# Patient Record
Sex: MALE | Race: BLACK OR AFRICAN AMERICAN | NOT HISPANIC OR LATINO | Employment: UNEMPLOYED | URBAN - METROPOLITAN AREA
[De-identification: names, ages, dates, MRNs, and addresses within clinical notes are randomized per-mention and may not be internally consistent; named-entity substitution may affect disease eponyms.]

---

## 2023-10-05 ENCOUNTER — HOSPITAL ENCOUNTER (EMERGENCY)
Facility: HOSPITAL | Age: 48
Discharge: STILL ADMITTED OR ADMINISTRATIVE | End: 2023-10-06
Payer: MEDICAID

## 2023-10-05 VITALS
OXYGEN SATURATION: 98 % | RESPIRATION RATE: 15 BRPM | WEIGHT: 150 LBS | HEIGHT: 67 IN | HEART RATE: 96 BPM | SYSTOLIC BLOOD PRESSURE: 164 MMHG | BODY MASS INDEX: 23.54 KG/M2 | TEMPERATURE: 98.2 F | DIASTOLIC BLOOD PRESSURE: 100 MMHG

## 2023-10-05 PROCEDURE — 4500999001 HC ED NO CHARGE

## 2023-10-05 PROCEDURE — 99281 EMR DPT VST MAYX REQ PHY/QHP: CPT

## 2023-10-05 ASSESSMENT — LIFESTYLE VARIABLES
EVER FELT BAD OR GUILTY ABOUT YOUR DRINKING: NO
HAVE YOU EVER FELT YOU SHOULD CUT DOWN ON YOUR DRINKING: NO
HAVE PEOPLE ANNOYED YOU BY CRITICIZING YOUR DRINKING: NO
EVER HAD A DRINK FIRST THING IN THE MORNING TO STEADY YOUR NERVES TO GET RID OF A HANGOVER: NO

## 2023-10-05 ASSESSMENT — COLUMBIA-SUICIDE SEVERITY RATING SCALE - C-SSRS
2. HAVE YOU ACTUALLY HAD ANY THOUGHTS OF KILLING YOURSELF?: NO
6. HAVE YOU EVER DONE ANYTHING, STARTED TO DO ANYTHING, OR PREPARED TO DO ANYTHING TO END YOUR LIFE?: NO
1. IN THE PAST MONTH, HAVE YOU WISHED YOU WERE DEAD OR WISHED YOU COULD GO TO SLEEP AND NOT WAKE UP?: NO

## 2023-11-28 ENCOUNTER — HOSPITAL ENCOUNTER (EMERGENCY)
Facility: HOSPITAL | Age: 48
Discharge: HOME | End: 2023-11-28
Attending: EMERGENCY MEDICINE
Payer: MEDICAID

## 2023-11-28 VITALS
SYSTOLIC BLOOD PRESSURE: 152 MMHG | HEART RATE: 78 BPM | OXYGEN SATURATION: 98 % | RESPIRATION RATE: 18 BRPM | DIASTOLIC BLOOD PRESSURE: 81 MMHG | TEMPERATURE: 97.6 F

## 2023-11-28 DIAGNOSIS — R55 SYNCOPE, UNSPECIFIED SYNCOPE TYPE: Primary | ICD-10-CM

## 2023-11-28 LAB
ALBUMIN SERPL BCP-MCNC: 4.7 G/DL (ref 3.4–5)
ALP SERPL-CCNC: 96 U/L (ref 33–120)
ALT SERPL W P-5'-P-CCNC: 13 U/L (ref 10–52)
ANION GAP SERPL CALC-SCNC: 20 MMOL/L (ref 10–20)
AST SERPL W P-5'-P-CCNC: 82 U/L (ref 9–39)
BASOPHILS # BLD AUTO: 0.04 X10*3/UL (ref 0–0.1)
BASOPHILS NFR BLD AUTO: 0.9 %
BILIRUB SERPL-MCNC: 0.5 MG/DL (ref 0–1.2)
BUN SERPL-MCNC: 17 MG/DL (ref 6–23)
CALCIUM SERPL-MCNC: 8.9 MG/DL (ref 8.6–10.3)
CARDIAC TROPONIN I PNL SERPL HS: 5 NG/L (ref 0–20)
CARDIAC TROPONIN I PNL SERPL HS: 5 NG/L (ref 0–20)
CHLORIDE SERPL-SCNC: 98 MMOL/L (ref 98–107)
CO2 SERPL-SCNC: 25 MMOL/L (ref 21–32)
CREAT SERPL-MCNC: 0.79 MG/DL (ref 0.5–1.3)
EOSINOPHIL # BLD AUTO: 0.02 X10*3/UL (ref 0–0.7)
EOSINOPHIL NFR BLD AUTO: 0.5 %
ERYTHROCYTE [DISTWIDTH] IN BLOOD BY AUTOMATED COUNT: 22.1 % (ref 11.5–14.5)
GFR SERPL CREATININE-BSD FRML MDRD: >90 ML/MIN/1.73M*2
GLUCOSE SERPL-MCNC: 140 MG/DL (ref 74–99)
HCT VFR BLD AUTO: 33.8 % (ref 41–52)
HGB BLD-MCNC: 12.3 G/DL (ref 13.5–17.5)
IMM GRANULOCYTES # BLD AUTO: 0.02 X10*3/UL (ref 0–0.7)
IMM GRANULOCYTES NFR BLD AUTO: 0.5 % (ref 0–0.9)
LYMPHOCYTES # BLD AUTO: 1.33 X10*3/UL (ref 1.2–4.8)
LYMPHOCYTES NFR BLD AUTO: 30.6 %
MCH RBC QN AUTO: 37 PG (ref 26–34)
MCHC RBC AUTO-ENTMCNC: 36.4 G/DL (ref 32–36)
MCV RBC AUTO: 102 FL (ref 80–100)
MONOCYTES # BLD AUTO: 0.38 X10*3/UL (ref 0.1–1)
MONOCYTES NFR BLD AUTO: 8.8 %
NEUTROPHILS # BLD AUTO: 2.55 X10*3/UL (ref 1.2–7.7)
NEUTROPHILS NFR BLD AUTO: 58.7 %
NRBC BLD-RTO: 0 /100 WBCS (ref 0–0)
PLATELET # BLD AUTO: 73 X10*3/UL (ref 150–450)
POTASSIUM SERPL-SCNC: 3.2 MMOL/L (ref 3.5–5.3)
PROT SERPL-MCNC: 7.3 G/DL (ref 6.4–8.2)
RBC # BLD AUTO: 3.32 X10*6/UL (ref 4.5–5.9)
RBC MORPH BLD: NORMAL
SODIUM SERPL-SCNC: 140 MMOL/L (ref 136–145)
STOMATOCYTES BLD QL SMEAR: NORMAL
TARGETS BLD QL SMEAR: NORMAL
WBC # BLD AUTO: 4.3 X10*3/UL (ref 4.4–11.3)

## 2023-11-28 PROCEDURE — 84484 ASSAY OF TROPONIN QUANT: CPT | Performed by: PHYSICIAN ASSISTANT

## 2023-11-28 PROCEDURE — 99284 EMERGENCY DEPT VISIT MOD MDM: CPT | Performed by: EMERGENCY MEDICINE

## 2023-11-28 PROCEDURE — 85025 COMPLETE CBC W/AUTO DIFF WBC: CPT | Performed by: PHYSICIAN ASSISTANT

## 2023-11-28 PROCEDURE — 84484 ASSAY OF TROPONIN QUANT: CPT | Performed by: EMERGENCY MEDICINE

## 2023-11-28 PROCEDURE — 36415 COLL VENOUS BLD VENIPUNCTURE: CPT | Performed by: PHYSICIAN ASSISTANT

## 2023-11-28 PROCEDURE — 80053 COMPREHEN METABOLIC PANEL: CPT | Performed by: PHYSICIAN ASSISTANT

## 2023-11-28 PROCEDURE — 36415 COLL VENOUS BLD VENIPUNCTURE: CPT | Performed by: EMERGENCY MEDICINE

## 2023-11-28 PROCEDURE — 99283 EMERGENCY DEPT VISIT LOW MDM: CPT

## 2023-11-28 RX ORDER — POTASSIUM CHLORIDE 20 MEQ/1
20 TABLET, EXTENDED RELEASE ORAL ONCE
Status: DISCONTINUED | OUTPATIENT
Start: 2023-11-28 | End: 2023-11-29 | Stop reason: HOSPADM

## 2023-11-28 RX ORDER — POTASSIUM CHLORIDE 20 MEQ/1
20 TABLET, EXTENDED RELEASE ORAL DAILY
Status: DISCONTINUED | OUTPATIENT
Start: 2023-11-29 | End: 2023-11-28

## 2023-11-28 ASSESSMENT — PAIN SCALES - GENERAL: PAINLEVEL_OUTOF10: 0 - NO PAIN

## 2023-11-28 ASSESSMENT — COLUMBIA-SUICIDE SEVERITY RATING SCALE - C-SSRS
6. HAVE YOU EVER DONE ANYTHING, STARTED TO DO ANYTHING, OR PREPARED TO DO ANYTHING TO END YOUR LIFE?: NO
2. HAVE YOU ACTUALLY HAD ANY THOUGHTS OF KILLING YOURSELF?: NO
1. IN THE PAST MONTH, HAVE YOU WISHED YOU WERE DEAD OR WISHED YOU COULD GO TO SLEEP AND NOT WAKE UP?: NO

## 2023-11-28 ASSESSMENT — PAIN - FUNCTIONAL ASSESSMENT: PAIN_FUNCTIONAL_ASSESSMENT: 0-10

## 2023-11-28 NOTE — ED TRIAGE NOTES
To ed from home with c/o dizziness, near syncope per pt at work.  Denies CP, SOB. ECG done upon triage

## 2023-11-28 NOTE — ED TRIAGE NOTES
TRIAGE NOTE   I saw the patient as the Clinician in Triage and performed a brief history and physical exam, established acuity, and ordered appropriate tests to develop basic plan of care. Patient will be seen by an ALEKSANDRA, resident and/or physician who will independently evaluate the patient. Please see subsequent provider notes for further details and disposition.     Brief HPI: In brief, Aguilar Wills is a 48 y.o. male that presents for syncopal episode 5 PM.  He was standing became dizzy nauseated diaphoretic.  Coworkers caught him preventing fall or injury..  No cardiorespiratory symptoms.  No neurological symptoms.  No bowel or bladder incontinence.  2 prior episodes of unclear etiology.  Treated for hypertension off his medications for 4 days.  Not diabetic.  No melena.  No GI or  symptoms.    Focused Physical exam:   Vital signs are stable mild tachycardia 102.  Afebrile.  Alert coherent ambulating without difficulty.  Normal neurologic exam.  Cardiovascular regular rate and rhythm.  Lungs clear to oscitation.    Plan/MDM:   EKG no STEMI.  Sinus rhythm rate 90.  QTc 408.  Orthostatic vitals laboratories studies ordered.    Please see subsequent provider note for further details and disposition

## 2023-11-29 NOTE — ED PROVIDER NOTES
"HPI   Chief Complaint   Patient presents with    Dizziness     To ed from work with feeling dizziness. States \"I almost past out\".  Denies pain or discomfort at this time        This is a 48-year-old male who presents to the emergency department after syncopal episode.  The patient states that he was at work when he became lightheaded and passed out.  The patient states that he has had similar episodes twice in the past.  There has been no cause.  At the time of this assessment, the patient feels back to normal.  He denies chest pain.  He denies shortness of breath.  He denies his heart racing.  He denies headache.  He denies numbness or weakness in his arms or legs.    Past medical history: Kidney failure, pancreatitis, hypertension                          [place] Coma Scale Score: 15                  Patient History   Past Medical History:   Diagnosis Date    Hypertension     Pancreatitis      No past surgical history on file.  No family history on file.  Social History     Tobacco Use    Smoking status: Every Day     Packs/day: 1     Types: Cigarettes    Smokeless tobacco: Never   Substance Use Topics    Alcohol use: Not on file    Drug use: Not on file       Physical Exam   ED Triage Vitals [11/28/23 1726]   Temp Heart Rate Resp BP   36.4 °C (97.6 °F) 102 16 121/82      SpO2 Temp src Heart Rate Source Patient Position   100 % -- -- --      BP Location FiO2 (%)     -- --       Physical Exam  Vitals and nursing note reviewed.   HENT:      Head: Normocephalic and atraumatic.      Nose: Nose normal.   Eyes:      Conjunctiva/sclera: Conjunctivae normal.   Cardiovascular:      Rate and Rhythm: Normal rate and regular rhythm.      Pulses: Normal pulses.      Heart sounds: Normal heart sounds.   Pulmonary:      Effort: Pulmonary effort is normal.      Breath sounds: Normal breath sounds.   Abdominal:      General: Bowel sounds are normal.      Palpations: Abdomen is soft.   Musculoskeletal:         General: Normal " range of motion.      Cervical back: Normal range of motion and neck supple.   Skin:     Findings: No rash.   Neurological:      General: No focal deficit present.      Mental Status: He is alert and oriented to person, place, and time.   Psychiatric:         Mood and Affect: Mood normal.         ED Course & MDM   Diagnoses as of 11/28/23 2126   Syncope, unspecified syncope type       Medical Decision Making  Differential diagnosis: I have considered the following conditions in my assessment of   this patient's condition: Hypotension, syncope, near syncope, abdominal aneurysm, cerebral vascular   accident, transient ischemic attack, GI bleed, ACS, pulmonary embolus, seizure, arrhythmia.    This is a 48-year-old male who presents to the emergency department complaining of syncope.  The patient is asymptomatic at the time of his evaluation in the emergency department.  He was evaluated with labs and EKG. Labs showed 2 negative troponins.  His white blood count is low at 4.3, hemoglobin mildly low at 12.3, platelet count low at 73.  On review of the patient's previous labs he has had similar labs to these.  Discussed these findings with the patient.  Suggested follow-up with hematology as an outpatient.  The patient is low risk based on [place] syncope rules.  He is appropriate for discharge and follow-up as an outpatient.    Amount and/or Complexity of Data Reviewed  ECG/medicine tests: independent interpretation performed.     Details: Normal sinus rhythm, heart rate 90, normal axis, no ST elevation.        Procedure  Procedures     Myron Roque MD  11/28/23 2131

## 2024-02-02 ENCOUNTER — HOSPITAL ENCOUNTER (EMERGENCY)
Facility: HOSPITAL | Age: 49
Discharge: HOME | End: 2024-02-03
Attending: EMERGENCY MEDICINE
Payer: MEDICAID

## 2024-02-02 DIAGNOSIS — F43.21 GRIEF: ICD-10-CM

## 2024-02-02 DIAGNOSIS — G47.00 INSOMNIA, UNSPECIFIED TYPE: Primary | ICD-10-CM

## 2024-02-02 DIAGNOSIS — F32.A DEPRESSION, UNSPECIFIED DEPRESSION TYPE: ICD-10-CM

## 2024-02-02 PROCEDURE — 99285 EMERGENCY DEPT VISIT HI MDM: CPT | Performed by: EMERGENCY MEDICINE

## 2024-02-02 PROCEDURE — 99285 EMERGENCY DEPT VISIT HI MDM: CPT | Mod: 25 | Performed by: EMERGENCY MEDICINE

## 2024-02-03 ENCOUNTER — CLINICAL SUPPORT (OUTPATIENT)
Dept: EMERGENCY MEDICINE | Facility: HOSPITAL | Age: 49
End: 2024-02-03
Payer: MEDICAID

## 2024-02-03 VITALS
DIASTOLIC BLOOD PRESSURE: 99 MMHG | OXYGEN SATURATION: 98 % | HEART RATE: 75 BPM | TEMPERATURE: 98.2 F | WEIGHT: 154.32 LBS | BODY MASS INDEX: 24.22 KG/M2 | RESPIRATION RATE: 16 BRPM | SYSTOLIC BLOOD PRESSURE: 149 MMHG | HEIGHT: 67 IN

## 2024-02-03 LAB
ALBUMIN SERPL BCP-MCNC: 4.4 G/DL (ref 3.4–5)
ALP SERPL-CCNC: 86 U/L (ref 33–120)
ALT SERPL W P-5'-P-CCNC: 26 U/L (ref 10–52)
AMPHETAMINES UR QL SCN: NORMAL
ANION GAP SERPL CALC-SCNC: 18 MMOL/L (ref 10–20)
APAP SERPL-MCNC: <10 UG/ML
APPEARANCE UR: CLEAR
AST SERPL W P-5'-P-CCNC: 55 U/L (ref 9–39)
BARBITURATES UR QL SCN: NORMAL
BASOPHILS # BLD AUTO: 0.1 X10*3/UL (ref 0–0.1)
BASOPHILS NFR BLD AUTO: 2.1 %
BENZODIAZ UR QL SCN: NORMAL
BILIRUB SERPL-MCNC: 0.6 MG/DL (ref 0–1.2)
BILIRUB UR STRIP.AUTO-MCNC: NEGATIVE MG/DL
BUN SERPL-MCNC: 18 MG/DL (ref 6–23)
BZE UR QL SCN: NORMAL
CALCIUM SERPL-MCNC: 9.1 MG/DL (ref 8.6–10.6)
CANNABINOIDS UR QL SCN: NORMAL
CHLORIDE SERPL-SCNC: 103 MMOL/L (ref 98–107)
CO2 SERPL-SCNC: 27 MMOL/L (ref 21–32)
COLOR UR: YELLOW
CREAT SERPL-MCNC: 0.76 MG/DL (ref 0.5–1.3)
EGFRCR SERPLBLD CKD-EPI 2021: >90 ML/MIN/1.73M*2
EOSINOPHIL # BLD AUTO: 0.13 X10*3/UL (ref 0–0.7)
EOSINOPHIL NFR BLD AUTO: 2.8 %
ERYTHROCYTE [DISTWIDTH] IN BLOOD BY AUTOMATED COUNT: 13.1 % (ref 11.5–14.5)
ETHANOL SERPL-MCNC: 275 MG/DL
FENTANYL+NORFENTANYL UR QL SCN: NORMAL
GLUCOSE SERPL-MCNC: 82 MG/DL (ref 74–99)
GLUCOSE UR STRIP.AUTO-MCNC: NEGATIVE MG/DL
HCT VFR BLD AUTO: 38.6 % (ref 41–52)
HGB BLD-MCNC: 14 G/DL (ref 13.5–17.5)
HOLD SPECIMEN: NORMAL
IMM GRANULOCYTES # BLD AUTO: 0 X10*3/UL (ref 0–0.7)
IMM GRANULOCYTES NFR BLD AUTO: 0 % (ref 0–0.9)
KETONES UR STRIP.AUTO-MCNC: ABNORMAL MG/DL
LEUKOCYTE ESTERASE UR QL STRIP.AUTO: NEGATIVE
LYMPHOCYTES # BLD AUTO: 3.76 X10*3/UL (ref 1.2–4.8)
LYMPHOCYTES NFR BLD AUTO: 80.2 %
MCH RBC QN AUTO: 36.3 PG (ref 26–34)
MCHC RBC AUTO-ENTMCNC: 36.3 G/DL (ref 32–36)
MCV RBC AUTO: 100 FL (ref 80–100)
MONOCYTES # BLD AUTO: 0.2 X10*3/UL (ref 0.1–1)
MONOCYTES NFR BLD AUTO: 4.3 %
MUCOUS THREADS #/AREA URNS AUTO: NORMAL /LPF
NEUTROPHILS # BLD AUTO: 0.5 X10*3/UL (ref 1.2–7.7)
NEUTROPHILS NFR BLD AUTO: 10.6 %
NITRITE UR QL STRIP.AUTO: NEGATIVE
NRBC BLD-RTO: 0 /100 WBCS (ref 0–0)
OPIATES UR QL SCN: NORMAL
OXYCODONE+OXYMORPHONE UR QL SCN: NORMAL
PCP UR QL SCN: NORMAL
PH UR STRIP.AUTO: 7 [PH]
PLATELET # BLD AUTO: ABNORMAL 10*3/UL
POTASSIUM SERPL-SCNC: 4 MMOL/L (ref 3.5–5.3)
PROT SERPL-MCNC: 7.6 G/DL (ref 6.4–8.2)
PROT UR STRIP.AUTO-MCNC: ABNORMAL MG/DL
RBC # BLD AUTO: 3.86 X10*6/UL (ref 4.5–5.9)
RBC # UR STRIP.AUTO: NEGATIVE /UL
RBC #/AREA URNS AUTO: NORMAL /HPF
RBC MORPH BLD: NORMAL
SALICYLATES SERPL-MCNC: <3 MG/DL
SARS-COV-2 RNA RESP QL NAA+PROBE: NOT DETECTED
SODIUM SERPL-SCNC: 144 MMOL/L (ref 136–145)
SP GR UR STRIP.AUTO: 1.02
STOMATOCYTES BLD QL SMEAR: NORMAL
TARGETS BLD QL SMEAR: NORMAL
UROBILINOGEN UR STRIP.AUTO-MCNC: 4 MG/DL
WBC # BLD AUTO: 4.7 X10*3/UL (ref 4.4–11.3)
WBC #/AREA URNS AUTO: NORMAL /HPF

## 2024-02-03 PROCEDURE — 81001 URINALYSIS AUTO W/SCOPE: CPT

## 2024-02-03 PROCEDURE — 93005 ELECTROCARDIOGRAM TRACING: CPT

## 2024-02-03 PROCEDURE — 87635 SARS-COV-2 COVID-19 AMP PRB: CPT

## 2024-02-03 PROCEDURE — 85025 COMPLETE CBC W/AUTO DIFF WBC: CPT

## 2024-02-03 PROCEDURE — 80053 COMPREHEN METABOLIC PANEL: CPT

## 2024-02-03 PROCEDURE — 80307 DRUG TEST PRSMV CHEM ANLYZR: CPT

## 2024-02-03 PROCEDURE — 80179 DRUG ASSAY SALICYLATE: CPT

## 2024-02-03 PROCEDURE — 36415 COLL VENOUS BLD VENIPUNCTURE: CPT

## 2024-02-03 RX ORDER — HYDROXYZINE HYDROCHLORIDE 25 MG/1
50 TABLET, FILM COATED ORAL NIGHTLY PRN
Qty: 30 TABLET | Refills: 0 | Status: SHIPPED | OUTPATIENT
Start: 2024-02-03 | End: 2024-03-04

## 2024-02-03 SDOH — HEALTH STABILITY: MENTAL HEALTH: HAVE YOU EVER DONE ANYTHING, STARTED TO DO ANYTHING, OR PREPARED TO DO ANYTHING TO END YOUR LIFE?: NO

## 2024-02-03 SDOH — HEALTH STABILITY: MENTAL HEALTH: HAVE YOU ACTUALLY HAD ANY THOUGHTS OF KILLING YOURSELF?: NO

## 2024-02-03 SDOH — HEALTH STABILITY: MENTAL HEALTH: SUICIDE ASSESSMENT: ADULT (C-SSRS)

## 2024-02-03 SDOH — HEALTH STABILITY: MENTAL HEALTH: HAVE YOU WISHED YOU WERE DEAD OR WISHED YOU COULD GO TO SLEEP AND NOT WAKE UP?: YES

## 2024-02-03 ASSESSMENT — LIFESTYLE VARIABLES
EVER HAD A DRINK FIRST THING IN THE MORNING TO STEADY YOUR NERVES TO GET RID OF A HANGOVER: NO
EVER FELT BAD OR GUILTY ABOUT YOUR DRINKING: NO
HAVE PEOPLE ANNOYED YOU BY CRITICIZING YOUR DRINKING: NO
HAVE YOU EVER FELT YOU SHOULD CUT DOWN ON YOUR DRINKING: NO

## 2024-02-03 ASSESSMENT — PAIN SCALES - GENERAL: PAINLEVEL_OUTOF10: 0 - NO PAIN

## 2024-02-03 ASSESSMENT — PAIN - FUNCTIONAL ASSESSMENT: PAIN_FUNCTIONAL_ASSESSMENT: 0-10

## 2024-02-03 NOTE — CONSULTS
Consults    Aguilar Wills is a 49yo male with reported past psychiatric history of bipolar disorder (not currently on medications) and alcohol use disorder who was brought to ED by police for suicidal and homicidal ideation in the context of acute alcohol intoxication () and just finding out yesterday that his “baby mama” had been killed 3 years ago.     HISTORY OF PRESENT ILLNESS  Patient reporting that finding out about the death of his “baby mama” (who had been living in [place]) triggered the realization that he still loved her and then an overwhelming sense of grief. He reports that when he learned the news, he just felt like his life wasn't worth living and that he should just end it. He states “When I lose someone, I just think about dying myself. I felt the same way when my gran .” He also reported feeling anger at the man who killed her and wanting to kill him.  He reports trying to drown out the feelings with alcohol and then calling 911 to talk to someone and instead finding the police at his door.     At time of interview, he continues to report grief and a desire for support but denies feeling like he would be better off dead or any thoughts of ending his life. He does report expressing thoughts of wanting to kill the man who killed his “baby mama” but he adamantly denies any plans or intent. Notably patient does not know the name or whereabouts of the man, so there is no imminent risk of harm to others if patient were intoxicated.  He reports his aggressiveness in the ED came out because other patient called him a crackhead, when he has never used crack.     Patient denies any persistent symptoms of depression; reports sudden low mood was due to hearing bad news. He does report longstanding difficulty falling asleep for which he has taken medication in the past. He denies signs concerning for nicolás. He denies any psychotic symptoms.     PSYCHIATRIC ROS  As per HPI    PAST PSYCHIATRIC  "HISTORY  Prior Diagnoses: Bipolar disorder, alcohol use disorder  Prior Hospitalizations: [facility] 2 years ago for suicidal ideation. Reports about 5 lifetime psychiatric admissions (for depression and nicolás   Suicide Attempts/self harm: Denies  Hx of trauma: \"I've been through it all\"   Outpatient treatment: Currently just has a PCP. PCP has given him information for psychiatry follow-up (motivated by disability claim)   Current psychiatric medications: No current medications  Past psychiatric medications: Fluoxetine, something for sleep    SOCIAL HISTORY  Currently lives: Alone  Work/Finances: On disability  Social support: Limited. Has uncle in [place]. Rest of family in [place].   Access to Weapons:  Denies    Social History     Socioeconomic History    Marital status: Unknown     Spouse name: Not on file    Number of children: Not on file    Years of education: Not on file    Highest education level: Not on file   Occupational History    Not on file   Tobacco Use    Smoking status: Every Day     Packs/day: 1     Types: Cigarettes    Smokeless tobacco: Never   Substance and Sexual Activity    Alcohol use: Not on file    Drug use: Not on file    Sexual activity: Not on file   Other Topics Concern    Not on file   Social History Narrative    Not on file     Social Determinants of Health     Financial Resource Strain: Not on file   Food Insecurity: Not on file   Transportation Needs: Not on file   Physical Activity: Not on file   Stress: Not on file   Social Connections: Not on file   Intimate Partner Violence: Not on file   Housing Stability: Not on file        SUBSTANCE HISTORY  Alcohol: Currently cut down to drinking 3-4 times a week due to cost. Will usually drink half a pint of liquor. Previously drinking daily.   Tobacco: Smokes 4-5 cigarettes daily. Cannabis: Denies  Illicit substances: Denies    PAST MEDICAL HISTORY  Past Medical History:   Diagnosis Date    Hypertension     Pancreatitis     " "  Current Medications:   Losartan    PAST SURGICAL HISTORY  No past surgical history on file.     ALLERGIES  Penicillins    OBJECTIVE    MENTAL STATUS EXAM  General: NAD  Appearance: Thin balding male, no teeth, dressed in hospital gown.   Attitude: Initially guarded. Then calm, cooperative, engaged in conversation  Behavior: appropriate eye contact  Motor Activity: no psychomotor agitation or retardation, no abnormal movements  Speech: spontaneous, normal rate, volume, and tone  Mood: \"This news was hard!\"  Affect: Appropriately reactive, not dysphoric.   Thought Content: Denies passive or active SI. Reports thoughts that he wishes person who killed his baby mama was dead but denies any plan or intent.  No delusions elicited.  Thought Perception: Denies AH/VH. Does not appear to be responding to internal stimuli.  Thought Process: organized, linear, goal-directed, associations were logical  Cognition: adequate attention and concentration, no gross deficits  Insight: Fair  Judgement: Poor - Uses alcohol as coping skill, has not followed up for psychiatric care.     LABS  Results for orders placed or performed during the hospital encounter of 02/02/24 (from the past 24 hour(s))   Drug Screen, Urine   Result Value Ref Range    Amphetamine Screen, Urine Presumptive Negative Presumptive Negative    Barbiturate Screen, Urine Presumptive Negative Presumptive Negative    Benzodiazepines Screen, Urine Presumptive Negative Presumptive Negative    Cannabinoid Screen, Urine Presumptive Negative Presumptive Negative    Cocaine Metabolite Screen, Urine Presumptive Negative Presumptive Negative    Fentanyl Screen, Urine Presumptive Negative Presumptive Negative    Opiate Screen, Urine Presumptive Negative Presumptive Negative    Oxycodone Screen, Urine Presumptive Negative Presumptive Negative    PCP Screen, Urine Presumptive Negative Presumptive Negative   Urinalysis with Reflex Culture and Microscopic   Result Value Ref Range "    Color, Urine Yellow Straw, Yellow    Appearance, Urine Clear Clear    Specific Gravity, Urine 1.024 1.005 - 1.035    pH, Urine 7.0 5.0, 5.5, 6.0, 6.5, 7.0, 7.5, 8.0    Protein, Urine 30 (1+) (N) NEGATIVE mg/dL    Glucose, Urine NEGATIVE NEGATIVE mg/dL    Blood, Urine NEGATIVE NEGATIVE    Ketones, Urine 5 (TRACE) (A) NEGATIVE mg/dL    Bilirubin, Urine NEGATIVE NEGATIVE    Urobilinogen, Urine 4.0 (N) <2.0 mg/dL    Nitrite, Urine NEGATIVE NEGATIVE    Leukocyte Esterase, Urine NEGATIVE NEGATIVE   Extra Urine Gray Tube   Result Value Ref Range    Extra Tube Hold for add-ons.    Urinalysis Microscopic   Result Value Ref Range    WBC, Urine NONE 1-5, NONE /HPF    RBC, Urine 1-2 NONE, 1-2, 3-5 /HPF    Mucus, Urine 1+ Reference range not established. /LPF   CBC and Auto Differential   Result Value Ref Range    WBC 4.7 4.4 - 11.3 x10*3/uL    nRBC 0.0 0.0 - 0.0 /100 WBCs    RBC 3.86 (L) 4.50 - 5.90 x10*6/uL    Hemoglobin 14.0 13.5 - 17.5 g/dL    Hematocrit 38.6 (L) 41.0 - 52.0 %     80 - 100 fL    MCH 36.3 (H) 26.0 - 34.0 pg    MCHC 36.3 (H) 32.0 - 36.0 g/dL    RDW 13.1 11.5 - 14.5 %    Platelets      Neutrophils % 10.6 40.0 - 80.0 %    Immature Granulocytes %, Automated 0.0 0.0 - 0.9 %    Lymphocytes % 80.2 13.0 - 44.0 %    Monocytes % 4.3 2.0 - 10.0 %    Eosinophils % 2.8 0.0 - 6.0 %    Basophils % 2.1 0.0 - 2.0 %    Neutrophils Absolute 0.50 (L) 1.20 - 7.70 x10*3/uL    Immature Granulocytes Absolute, Automated 0.00 0.00 - 0.70 x10*3/uL    Lymphocytes Absolute 3.76 1.20 - 4.80 x10*3/uL    Monocytes Absolute 0.20 0.10 - 1.00 x10*3/uL    Eosinophils Absolute 0.13 0.00 - 0.70 x10*3/uL    Basophils Absolute 0.10 0.00 - 0.10 x10*3/uL   Comprehensive metabolic panel   Result Value Ref Range    Glucose 82 74 - 99 mg/dL    Sodium 144 136 - 145 mmol/L    Potassium 4.0 3.5 - 5.3 mmol/L    Chloride 103 98 - 107 mmol/L    Bicarbonate 27 21 - 32 mmol/L    Anion Gap 18 10 - 20 mmol/L    Urea Nitrogen 18 6 - 23 mg/dL     Creatinine 0.76 0.50 - 1.30 mg/dL    eGFR >90 >60 mL/min/1.73m*2    Calcium 9.1 8.6 - 10.6 mg/dL    Albumin 4.4 3.4 - 5.0 g/dL    Alkaline Phosphatase 86 33 - 120 U/L    Total Protein 7.6 6.4 - 8.2 g/dL    AST 55 (H) 9 - 39 U/L    Bilirubin, Total 0.6 0.0 - 1.2 mg/dL    ALT 26 10 - 52 U/L   Acute Toxicology Panel, Blood   Result Value Ref Range    Acetaminophen <10.0 10.0 - 30.0 ug/mL    Salicylate  <3 4 - 20 mg/dL    Alcohol 275 (H) <=10 mg/dL   Sars-CoV-2 PCR, Symptomatic   Result Value Ref Range    Coronavirus 2019, PCR Not Detected Not Detected   Morphology   Result Value Ref Range    RBC Morphology See Below     Target Cells Few     Stomatocytes Few          PSYCHIATRIC RISK ASSESSMENT  Violence Risk Factors:  male, past history of violence, substance abuse , unemployed, lower socioeconomic status, violent or homicidal ideation, and impulsivity  Acute Risk of Harm to Others is Considered: Moderate  Suicide Risk Factors: male, lives alone or lack of social support, history of trauma or abuse, current psychiatric illness, recent loss or impending loss of loved one, failed relationship the last year, and substance abuse   Protective Factors: sense of responsibility towards family and hopefulness/future-orientation  Acute Risk of Harm to Self is Considered: Moderate    ASSESSMENT AND PLAN:  Aguilar Wills is a 47yo male with reported past psychiatric history of bipolar disorder (not currently on medications) and alcohol use disorder who was brought to ED by police for suicidal and homicidal ideation in the context of acute alcohol intoxication () and just finding out yesterday that his “baby mama” had been killed 3 years ago. Patient initially presented to ED as quite agitated and required seclusion overnight in the context of threatened violence toward another patient in the ED.     Patient was seen today for psychiatry evaluation after he had sobered up, having been calm/sleeping through the morning. On  interview, patient was initially guarded but then pleasant and cooperative. Overall, patient denies any persistent symptoms of depression or signs concerning for nicolás. While he has a difficult time managing emotions and nathaniel through alcohol use, as he has sobered up, the acute safety concerns of harm to self and others have significantly decreased. While he would benefit from outpatient psychiatric follow-up, he does not meet criteria for inpatient psychiatric admission at this time.     Impression:  Alcohol Use Disorder  Bipolar Disorder, by history      Recommendations:  -- Patient does NOT currently meet criteria for inpatient psychiatric admission.   -- Patient is stable for discharge from a psychiatric viewpoint.      -- Medications:  - START Hydroxyzine 50mg QHS PRN for persistent difficulty falling asleep     -- Patient follow-up details: Patient to follow-up with psychiatrist in [facility] coordinated by his PCP (reports has information in his phone)   --Continue to encourage patient to cut back on alcohol use.     -- Discussed recommendations with primary team.     Patient was discussed with on call attending, Dr. Kebede, who agreed with above plan.    Medication Consent  Medication Consent: n/a; consult service

## 2024-02-03 NOTE — ED TRIAGE NOTES
"pt states his mother told him today, that his \"baby ivet is dead', ' she  three years ago\". now pt states he wants to kill himself, pt admits to drinking a pint of liquor tonight. pt states his psychiatrist is at [facility]   "

## 2024-02-03 NOTE — ED PROVIDER NOTES
"HPI   Chief Complaint   Patient presents with    Suicidal       Patient is a 48-year-old male depression, history of substance abuse presenting to the emergency department for suicidal homicidal ideations.  Patient found out that his \"baby mama\" had .  He had the urge to kill himself.  He would not elaborate on how this death occurred or what his thoughts of killing himself were.  He does state that he wants to kill \"him\" but would not specify who this person is.  He admits that he had drank a pint of alcohol earlier tonight.  He has a psychiatrist at [facility] and states that he wants to speak with him.  He denies any other complaints.      History provided by:  Patient                      [place] Coma Scale Score: 15                  Patient History   Past Medical History:   Diagnosis Date    Hypertension     Pancreatitis      No past surgical history on file.  No family history on file.  Social History     Tobacco Use    Smoking status: Every Day     Packs/day: 1     Types: Cigarettes    Smokeless tobacco: Never   Substance Use Topics    Alcohol use: Not on file    Drug use: Not on file       Physical Exam   ED Triage Vitals [24 2303]   Temp Heart Rate Respirations BP   -- 78 15 131/70      Pulse Ox Temp src Heart Rate Source Patient Position   99 % -- -- --      BP Location FiO2 (%)     -- --       Physical Exam  Vitals and nursing note reviewed.   Constitutional:       General: He is not in acute distress.     Appearance: He is well-developed.   HENT:      Head: Normocephalic and atraumatic.      Right Ear: External ear normal.      Left Ear: External ear normal.      Nose: Nose normal.   Eyes:      General: No scleral icterus.     Conjunctiva/sclera: Conjunctivae normal.      Pupils: Pupils are equal, round, and reactive to light.   Cardiovascular:      Rate and Rhythm: Normal rate and regular rhythm.      Heart sounds: No murmur heard.  Pulmonary:      Effort: Pulmonary effort is normal. No " respiratory distress.      Breath sounds: Normal breath sounds.   Abdominal:      Palpations: Abdomen is soft.      Tenderness: There is no abdominal tenderness.   Musculoskeletal:         General: No swelling.      Cervical back: Neck supple. No rigidity.   Skin:     General: Skin is warm and dry.   Neurological:      General: No focal deficit present.      Mental Status: He is alert.   Psychiatric:         Mood and Affect: Mood normal.         ED Course & MDM        Medical Decision Making  Patient is a 48-year-old male presents emergency room for suicidal homicidal ideations.  He would not give specifics around the circumstances besides that someone he knew is dead.  He does admit to drinking alcohol today.  His thought process is somewhat tangential, but he denies any medical complaints.  He is hemodynamically stable, awake and alert, no acute distress.  Patient lacks capacity at this time.  We will obtain lab work for clearance for patient to be evaluated by psychiatry.  We attempted to call the patient's mother at [phone] multiple times, but went straight to voicemail.    Patient has a blood alcohol level of 275.  COVID-negative.  Lab work otherwise unremarkable.  Patient is medically cleared for evaluation by EPAT.  Patient signed out to incoming physician pending evaluation by EPAT.    Denis Sexton DO, PGY 3  Emergency Medicine Resident    Amount and/or Complexity of Data Reviewed  Labs: ordered. Decision-making details documented in ED Course.  ECG/medicine tests: ordered and independent interpretation performed. Decision-making details documented in ED Course.     Details: EKG at 0421 on 2/3/2024 as interpreted by myself: Ventricular rate 70, , QRS 98, QTc 436.  Normal sinus rhythm.  No acute injury pattern.        Procedure  Procedures     Denis Sexton DO  Resident  02/03/24 0938

## 2024-02-03 NOTE — PROGRESS NOTES
Behavioral Restraint / Seclusion Face to Face Assessment    Patient Name:         Aguilar Wills  YOB: 1975  Medical Record #:   [id number]      Time Restraints were placed:      Date Assessment was completed: 2/3/2024    Time patient was assessed:  0440     Description of behavior causing restraint/seclusion: verbalizing threats to self or others and combative and striking out at staff or others    Type of intervention: Seclusion    Patient's immediate situation: alert and oriented, no signs of physical distress, no signs of psychological distress, and aggressive    Alternatives Attempted: Alternatives attempted and have been ineffective.    Contraindications for Restraints: Reviewed contraindications for continued restraint use and agree to on-going need.    Patent's reaction to intervention: appears to be tolerating restraint/seclusion without distress or adverse response and behaviors have lessened but are still present    Patient's medical condition: vital signs stable, normal circulation and breathing, positioned safely based upon medical and psychological issues, skin is protected, and hydration and nutrition are addressed    Patient's behavioral condition: continues to display agitated, threatening, or violent behavior to others    Plan: Continue restraints

## 2024-02-03 NOTE — PROGRESS NOTES
I received Aguilar Wills in signout from Dr. Taylor.  Please see the previous note for all HPI, PE and MDM up to the time of signout at 0700.    In brief Aguilar Wills is an 48 y.o. male presenting for   Chief Complaint   Patient presents with    Suicidal   .  At the time of signout we were awaiting: epat evaluation    ED Course as of 02/03/24 1332   Sat Feb 03, 2024   1329 Pt seen by epat, pt calm, cooperative. Found out yesterday that his child's mother was killed 3 years ago. She was in [place]. He does not know who did this, would not go to [place] to look for him. Denies SI/HI. Follows with PCP, has referral to psych from PCP. In agreement for discharge home with outpt follow up [LP]      ED Course User Index  [LP] Alejandra Kwong DO         Diagnoses as of 02/03/24 1332   Insomnia, unspecified type   Depression, unspecified depression type   Grief       Pt Disposition: discharged    Procedures    Alejandra Kwong DO  Emergency Medicine  Medical Toxicology

## 2024-02-03 NOTE — PROGRESS NOTES
Behavioral Restraint / Seclusion Face to Face Assessment    Patient Name:         Aguilar Wills  YOB: 1975  Medical Record #:   [id number]      Time Restraints were placed:  0125    Date Assessment was completed: 2/3/2024    Time patient was assessed:  0120     Description of behavior causing restraint/seclusion: verbalizing threats to self or others and combative and striking out at staff or others    Type of intervention: Seclusion    Patient's immediate situation: alert and oriented, no signs of physical distress, no signs of psychological distress, and aggressive    Alternatives Attempted: Alternatives attempted and have been ineffective.    Contraindications for Restraints: Reviewed contraindications for continued restraint use and agree to on-going need.    Patent's reaction to intervention: appears safe, appears comfortable, appears to be tolerating restraint/seclusion without distress or adverse response, and behaviors have lessened but are still present    Patient's medical condition: vital signs stable, normal circulation and breathing, positioned safely based upon medical and psychological issues, skin is protected, and hydration and nutrition are addressed    Patient's behavioral condition: continues to display agitated, threatening, or violent behavior to others    Plan: Continue restraints

## 2024-02-03 NOTE — PROGRESS NOTES

## 2024-02-05 LAB
ATRIAL RATE: 70 BPM
P AXIS: 80 DEGREES
P OFFSET: 183 MS
P ONSET: 136 MS
PR INTERVAL: 164 MS
Q ONSET: 218 MS
QRS COUNT: 12 BEATS
QRS DURATION: 98 MS
QT INTERVAL: 404 MS
QTC CALCULATION(BAZETT): 436 MS
QTC FREDERICIA: 425 MS
R AXIS: 89 DEGREES
T AXIS: 82 DEGREES
T OFFSET: 420 MS
VENTRICULAR RATE: 70 BPM

## 2024-03-30 ENCOUNTER — HOSPITAL ENCOUNTER (INPATIENT)
Facility: HOSPITAL | Age: 49
LOS: 4 days | Discharge: HOME | End: 2024-04-03
Attending: EMERGENCY MEDICINE | Admitting: STUDENT IN AN ORGANIZED HEALTH CARE EDUCATION/TRAINING PROGRAM
Payer: MEDICAID

## 2024-03-30 ENCOUNTER — APPOINTMENT (OUTPATIENT)
Dept: RADIOLOGY | Facility: HOSPITAL | Age: 49
End: 2024-03-30
Payer: MEDICAID

## 2024-03-30 DIAGNOSIS — K92.2 GASTROINTESTINAL HEMORRHAGE, UNSPECIFIED GASTROINTESTINAL HEMORRHAGE TYPE: ICD-10-CM

## 2024-03-30 DIAGNOSIS — I10 PRIMARY HYPERTENSION: ICD-10-CM

## 2024-03-30 DIAGNOSIS — K92.1 MELENA: ICD-10-CM

## 2024-03-30 DIAGNOSIS — K92.2 LOWER GI BLEED: Primary | ICD-10-CM

## 2024-03-30 DIAGNOSIS — F10.90 ALCOHOL USE DISORDER: ICD-10-CM

## 2024-03-30 PROBLEM — F31.9 BIPOLAR DEPRESSION (MULTI): Status: ACTIVE | Noted: 2024-03-30

## 2024-03-30 LAB
ABO GROUP (TYPE) IN BLOOD: NORMAL
ALBUMIN SERPL BCP-MCNC: 4 G/DL (ref 3.4–5)
ALBUMIN SERPL BCP-MCNC: 4.8 G/DL (ref 3.4–5)
ALP SERPL-CCNC: 79 U/L (ref 33–120)
ALT SERPL W P-5'-P-CCNC: 7 U/L (ref 10–52)
ANION GAP BLDV CALCULATED.4IONS-SCNC: 10 MMOL/L (ref 10–25)
ANION GAP SERPL CALC-SCNC: 10 MMOL/L (ref 10–20)
ANION GAP SERPL CALC-SCNC: 15 MMOL/L (ref 10–20)
ANTIBODY SCREEN: NORMAL
APPEARANCE UR: CLEAR
APTT PPP: 24 SECONDS (ref 27–38)
AST SERPL W P-5'-P-CCNC: 33 U/L (ref 9–39)
BASE EXCESS BLDV CALC-SCNC: 3.9 MMOL/L (ref -2–3)
BASOPHILS # BLD AUTO: 0.03 X10*3/UL (ref 0–0.1)
BASOPHILS NFR BLD AUTO: 0.6 %
BILIRUB DIRECT SERPL-MCNC: 0.2 MG/DL (ref 0–0.3)
BILIRUB SERPL-MCNC: 1.1 MG/DL (ref 0–1.2)
BILIRUB UR STRIP.AUTO-MCNC: NEGATIVE MG/DL
BODY TEMPERATURE: 37 DEGREES CELSIUS
BUN SERPL-MCNC: 5 MG/DL (ref 6–23)
BUN SERPL-MCNC: 8 MG/DL (ref 6–23)
CA-I BLDV-SCNC: 1.13 MMOL/L (ref 1.1–1.33)
CALCIUM SERPL-MCNC: 8.4 MG/DL (ref 8.6–10.6)
CALCIUM SERPL-MCNC: 9 MG/DL (ref 8.6–10.6)
CHLORIDE BLDV-SCNC: 100 MMOL/L (ref 98–107)
CHLORIDE SERPL-SCNC: 100 MMOL/L (ref 98–107)
CHLORIDE SERPL-SCNC: 103 MMOL/L (ref 98–107)
CO2 SERPL-SCNC: 26 MMOL/L (ref 21–32)
CO2 SERPL-SCNC: 26 MMOL/L (ref 21–32)
COLOR UR: YELLOW
CREAT SERPL-MCNC: 0.51 MG/DL (ref 0.5–1.3)
CREAT SERPL-MCNC: 0.59 MG/DL (ref 0.5–1.3)
EGFRCR SERPLBLD CKD-EPI 2021: >90 ML/MIN/1.73M*2
EGFRCR SERPLBLD CKD-EPI 2021: >90 ML/MIN/1.73M*2
EOSINOPHIL # BLD AUTO: 0.04 X10*3/UL (ref 0–0.7)
EOSINOPHIL NFR BLD AUTO: 0.8 %
ERYTHROCYTE [DISTWIDTH] IN BLOOD BY AUTOMATED COUNT: 20.5 % (ref 11.5–14.5)
ETHANOL SERPL-MCNC: <10 MG/DL
GLUCOSE BLDV-MCNC: 125 MG/DL (ref 74–99)
GLUCOSE SERPL-MCNC: 118 MG/DL (ref 74–99)
GLUCOSE SERPL-MCNC: 94 MG/DL (ref 74–99)
GLUCOSE UR STRIP.AUTO-MCNC: NORMAL MG/DL
HCO3 BLDV-SCNC: 30.8 MMOL/L (ref 22–26)
HCT VFR BLD AUTO: 28.8 % (ref 41–52)
HCT VFR BLD EST: 34 % (ref 41–52)
HGB BLD-MCNC: 10.7 G/DL (ref 13.5–17.5)
HGB BLDV-MCNC: 11.2 G/DL (ref 13.5–17.5)
HOLD SPECIMEN: NORMAL
IMM GRANULOCYTES # BLD AUTO: 0.02 X10*3/UL (ref 0–0.7)
IMM GRANULOCYTES NFR BLD AUTO: 0.4 % (ref 0–0.9)
INHALED O2 CONCENTRATION: 21 %
INR PPP: 1 (ref 0.9–1.1)
KETONES UR STRIP.AUTO-MCNC: NEGATIVE MG/DL
LACTATE BLDV-SCNC: 2.6 MMOL/L (ref 0.4–2)
LEUKOCYTE ESTERASE UR QL STRIP.AUTO: NEGATIVE
LIPASE SERPL-CCNC: 24 U/L (ref 9–82)
LYMPHOCYTES # BLD AUTO: 1.8 X10*3/UL (ref 1.2–4.8)
LYMPHOCYTES NFR BLD AUTO: 36.5 %
MAGNESIUM SERPL-MCNC: 1.35 MG/DL (ref 1.6–2.4)
MAGNESIUM SERPL-MCNC: 1.37 MG/DL (ref 1.6–2.4)
MAGNESIUM SERPL-MCNC: 1.74 MG/DL (ref 1.6–2.4)
MCH RBC QN AUTO: 35.9 PG (ref 26–34)
MCHC RBC AUTO-ENTMCNC: 37.2 G/DL (ref 32–36)
MCV RBC AUTO: 97 FL (ref 80–100)
MONOCYTES # BLD AUTO: 0.3 X10*3/UL (ref 0.1–1)
MONOCYTES NFR BLD AUTO: 6.1 %
NEUTROPHILS # BLD AUTO: 2.74 X10*3/UL (ref 1.2–7.7)
NEUTROPHILS NFR BLD AUTO: 55.6 %
NITRITE UR QL STRIP.AUTO: NEGATIVE
NRBC BLD-RTO: 1.6 /100 WBCS (ref 0–0)
OXYHGB MFR BLDV: 20.3 % (ref 45–75)
PCO2 BLDV: 57 MM HG (ref 41–51)
PH BLDV: 7.34 PH (ref 7.33–7.43)
PH UR STRIP.AUTO: 6.5 [PH]
PHOSPHATE SERPL-MCNC: 2.5 MG/DL (ref 2.5–4.9)
PHOSPHATE SERPL-MCNC: 2.7 MG/DL (ref 2.5–4.9)
PLATELET # BLD AUTO: 82 X10*3/UL (ref 150–450)
PO2 BLDV: 22 MM HG (ref 35–45)
POLYCHROMASIA BLD QL SMEAR: NORMAL
POTASSIUM BLDV-SCNC: 3 MMOL/L (ref 3.5–5.3)
POTASSIUM SERPL-SCNC: 3 MMOL/L (ref 3.5–5.3)
POTASSIUM SERPL-SCNC: 3.3 MMOL/L (ref 3.5–5.3)
PROT SERPL-MCNC: 7.7 G/DL (ref 6.4–8.2)
PROT UR STRIP.AUTO-MCNC: NEGATIVE MG/DL
PROTHROMBIN TIME: 10.9 SECONDS (ref 9.8–12.8)
RBC # BLD AUTO: 2.98 X10*6/UL (ref 4.5–5.9)
RBC # UR STRIP.AUTO: NEGATIVE /UL
RBC MORPH BLD: NORMAL
RH FACTOR (ANTIGEN D): NORMAL
SALICYLATES SERPL-MCNC: <3 MG/DL
SAO2 % BLDV: 21 % (ref 45–75)
SODIUM BLDV-SCNC: 138 MMOL/L (ref 136–145)
SODIUM SERPL-SCNC: 136 MMOL/L (ref 136–145)
SODIUM SERPL-SCNC: 138 MMOL/L (ref 136–145)
SP GR UR STRIP.AUTO: 1.02
STOMATOCYTES BLD QL SMEAR: NORMAL
TARGETS BLD QL SMEAR: NORMAL
UROBILINOGEN UR STRIP.AUTO-MCNC: ABNORMAL MG/DL
WBC # BLD AUTO: 4.9 X10*3/UL (ref 4.4–11.3)

## 2024-03-30 PROCEDURE — 2500000002 HC RX 250 W HCPCS SELF ADMINISTERED DRUGS (ALT 637 FOR MEDICARE OP, ALT 636 FOR OP/ED): Mod: SE

## 2024-03-30 PROCEDURE — 84132 ASSAY OF SERUM POTASSIUM: CPT

## 2024-03-30 PROCEDURE — 83735 ASSAY OF MAGNESIUM: CPT

## 2024-03-30 PROCEDURE — 2500000001 HC RX 250 WO HCPCS SELF ADMINISTERED DRUGS (ALT 637 FOR MEDICARE OP)

## 2024-03-30 PROCEDURE — 85610 PROTHROMBIN TIME: CPT

## 2024-03-30 PROCEDURE — 2500000004 HC RX 250 GENERAL PHARMACY W/ HCPCS (ALT 636 FOR OP/ED): Mod: SE

## 2024-03-30 PROCEDURE — 2500000001 HC RX 250 WO HCPCS SELF ADMINISTERED DRUGS (ALT 637 FOR MEDICARE OP): Mod: SE

## 2024-03-30 PROCEDURE — 74177 CT ABD & PELVIS W/CONTRAST: CPT

## 2024-03-30 PROCEDURE — 74177 CT ABD & PELVIS W/CONTRAST: CPT | Performed by: RADIOLOGY

## 2024-03-30 PROCEDURE — 96365 THER/PROPH/DIAG IV INF INIT: CPT

## 2024-03-30 PROCEDURE — 93010 ELECTROCARDIOGRAM REPORT: CPT | Performed by: EMERGENCY MEDICINE

## 2024-03-30 PROCEDURE — 96366 THER/PROPH/DIAG IV INF ADDON: CPT

## 2024-03-30 PROCEDURE — 99285 EMERGENCY DEPT VISIT HI MDM: CPT | Mod: 25

## 2024-03-30 PROCEDURE — 80179 DRUG ASSAY SALICYLATE: CPT

## 2024-03-30 PROCEDURE — 84100 ASSAY OF PHOSPHORUS: CPT

## 2024-03-30 PROCEDURE — 81003 URINALYSIS AUTO W/O SCOPE: CPT

## 2024-03-30 PROCEDURE — 1200000002 HC GENERAL ROOM WITH TELEMETRY DAILY

## 2024-03-30 PROCEDURE — 83690 ASSAY OF LIPASE: CPT

## 2024-03-30 PROCEDURE — 85730 THROMBOPLASTIN TIME PARTIAL: CPT

## 2024-03-30 PROCEDURE — 96375 TX/PRO/DX INJ NEW DRUG ADDON: CPT

## 2024-03-30 PROCEDURE — 2550000001 HC RX 255 CONTRASTS: Mod: SE | Performed by: EMERGENCY MEDICINE

## 2024-03-30 PROCEDURE — 99222 1ST HOSP IP/OBS MODERATE 55: CPT

## 2024-03-30 PROCEDURE — 96368 THER/DIAG CONCURRENT INF: CPT

## 2024-03-30 PROCEDURE — 85025 COMPLETE CBC W/AUTO DIFF WBC: CPT

## 2024-03-30 PROCEDURE — 82248 BILIRUBIN DIRECT: CPT

## 2024-03-30 PROCEDURE — 2500000002 HC RX 250 W HCPCS SELF ADMINISTERED DRUGS (ALT 637 FOR MEDICARE OP, ALT 636 FOR OP/ED)

## 2024-03-30 PROCEDURE — 2500000004 HC RX 250 GENERAL PHARMACY W/ HCPCS (ALT 636 FOR OP/ED): Mod: SE | Performed by: EMERGENCY MEDICINE

## 2024-03-30 PROCEDURE — 86901 BLOOD TYPING SEROLOGIC RH(D): CPT

## 2024-03-30 PROCEDURE — 82435 ASSAY OF BLOOD CHLORIDE: CPT

## 2024-03-30 PROCEDURE — 36415 COLL VENOUS BLD VENIPUNCTURE: CPT

## 2024-03-30 PROCEDURE — 86850 RBC ANTIBODY SCREEN: CPT

## 2024-03-30 PROCEDURE — 85027 COMPLETE CBC AUTOMATED: CPT

## 2024-03-30 PROCEDURE — 82077 ASSAY SPEC XCP UR&BREATH IA: CPT | Performed by: NURSE PRACTITIONER

## 2024-03-30 PROCEDURE — 99285 EMERGENCY DEPT VISIT HI MDM: CPT

## 2024-03-30 RX ORDER — MULTIVIT-MIN/IRON FUM/FOLIC AC 7.5 MG-4
1 TABLET ORAL DAILY
Status: DISCONTINUED | OUTPATIENT
Start: 2024-03-30 | End: 2024-04-03 | Stop reason: HOSPADM

## 2024-03-30 RX ORDER — LANOLIN ALCOHOL/MO/W.PET/CERES
100 CREAM (GRAM) TOPICAL DAILY
Status: DISCONTINUED | OUTPATIENT
Start: 2024-03-30 | End: 2024-04-03 | Stop reason: HOSPADM

## 2024-03-30 RX ORDER — POTASSIUM CHLORIDE 20 MEQ/1
40 TABLET, EXTENDED RELEASE ORAL ONCE
Status: COMPLETED | OUTPATIENT
Start: 2024-03-30 | End: 2024-03-30

## 2024-03-30 RX ORDER — ENOXAPARIN SODIUM 100 MG/ML
40 INJECTION SUBCUTANEOUS EVERY 24 HOURS
Status: DISCONTINUED | OUTPATIENT
Start: 2024-03-30 | End: 2024-03-30

## 2024-03-30 RX ORDER — MAGNESIUM SULFATE HEPTAHYDRATE 40 MG/ML
2 INJECTION, SOLUTION INTRAVENOUS ONCE
Status: COMPLETED | OUTPATIENT
Start: 2024-03-30 | End: 2024-03-30

## 2024-03-30 RX ORDER — POTASSIUM CHLORIDE 20 MEQ/1
40 TABLET, EXTENDED RELEASE ORAL DAILY
Status: DISCONTINUED | OUTPATIENT
Start: 2024-03-30 | End: 2024-03-30

## 2024-03-30 RX ORDER — FAMOTIDINE 10 MG/ML
20 INJECTION INTRAVENOUS ONCE
Status: COMPLETED | OUTPATIENT
Start: 2024-03-30 | End: 2024-03-30

## 2024-03-30 RX ORDER — DIAZEPAM 5 MG/ML
10 INJECTION, SOLUTION INTRAMUSCULAR; INTRAVENOUS EVERY 2 HOUR PRN
Status: DISCONTINUED | OUTPATIENT
Start: 2024-03-30 | End: 2024-04-03 | Stop reason: HOSPADM

## 2024-03-30 RX ORDER — FOLIC ACID 1 MG/1
1 TABLET ORAL DAILY
Status: DISCONTINUED | OUTPATIENT
Start: 2024-03-30 | End: 2024-04-03 | Stop reason: HOSPADM

## 2024-03-30 RX ORDER — HYDROXYZINE HYDROCHLORIDE 25 MG/1
50 TABLET, FILM COATED ORAL NIGHTLY PRN
Status: DISCONTINUED | OUTPATIENT
Start: 2024-03-30 | End: 2024-04-03 | Stop reason: HOSPADM

## 2024-03-30 RX ORDER — POTASSIUM CHLORIDE 14.9 MG/ML
20 INJECTION INTRAVENOUS ONCE
Status: COMPLETED | OUTPATIENT
Start: 2024-03-30 | End: 2024-03-30

## 2024-03-30 RX ADMIN — MAGNESIUM SULFATE HEPTAHYDRATE 2 G: 40 INJECTION, SOLUTION INTRAVENOUS at 11:14

## 2024-03-30 RX ADMIN — POTASSIUM CHLORIDE 20 MEQ: 14.9 INJECTION, SOLUTION INTRAVENOUS at 11:14

## 2024-03-30 RX ADMIN — SODIUM CHLORIDE 1000 ML: 9 INJECTION, SOLUTION INTRAVENOUS at 14:47

## 2024-03-30 RX ADMIN — IOHEXOL 80 ML: 350 INJECTION, SOLUTION INTRAVENOUS at 11:05

## 2024-03-30 RX ADMIN — FOLIC ACID 1 MG: 1 TABLET ORAL at 11:31

## 2024-03-30 RX ADMIN — SODIUM CHLORIDE, POTASSIUM CHLORIDE, SODIUM LACTATE AND CALCIUM CHLORIDE 1000 ML: 600; 310; 30; 20 INJECTION, SOLUTION INTRAVENOUS at 11:14

## 2024-03-30 RX ADMIN — LOSARTAN POTASSIUM: 50 TABLET, FILM COATED ORAL at 20:02

## 2024-03-30 RX ADMIN — THIAMINE HCL TAB 100 MG 100 MG: 100 TAB at 11:31

## 2024-03-30 RX ADMIN — Medication 1 TABLET: at 11:31

## 2024-03-30 RX ADMIN — FAMOTIDINE 20 MG: 10 INJECTION INTRAVENOUS at 11:40

## 2024-03-30 RX ADMIN — POTASSIUM CHLORIDE 40 MEQ: 1500 TABLET, EXTENDED RELEASE ORAL at 23:38

## 2024-03-30 RX ADMIN — POTASSIUM CHLORIDE 40 MEQ: 1500 TABLET, EXTENDED RELEASE ORAL at 11:31

## 2024-03-30 SDOH — SOCIAL STABILITY: SOCIAL INSECURITY: DOES ANYONE TRY TO KEEP YOU FROM HAVING/CONTACTING OTHER FRIENDS OR DOING THINGS OUTSIDE YOUR HOME?: NO

## 2024-03-30 SDOH — HEALTH STABILITY: MENTAL HEALTH: EXPERIENCED ANY OF THE FOLLOWING LIFE EVENTS: DEATH OF FAMILY/FRIEND

## 2024-03-30 SDOH — SOCIAL STABILITY: SOCIAL INSECURITY: DO YOU FEEL ANYONE HAS EXPLOITED OR TAKEN ADVANTAGE OF YOU FINANCIALLY OR OF YOUR PERSONAL PROPERTY?: NO

## 2024-03-30 SDOH — SOCIAL STABILITY: SOCIAL INSECURITY: DO YOU FEEL UNSAFE GOING BACK TO THE PLACE WHERE YOU ARE LIVING?: NO

## 2024-03-30 SDOH — SOCIAL STABILITY: SOCIAL INSECURITY: HAVE YOU HAD THOUGHTS OF HARMING ANYONE ELSE?: NO

## 2024-03-30 SDOH — SOCIAL STABILITY: SOCIAL INSECURITY: HAS ANYONE EVER THREATENED TO HURT YOUR FAMILY OR YOUR PETS?: NO

## 2024-03-30 SDOH — SOCIAL STABILITY: SOCIAL INSECURITY: ARE THERE ANY APPARENT SIGNS OF INJURIES/BEHAVIORS THAT COULD BE RELATED TO ABUSE/NEGLECT?: NO

## 2024-03-30 SDOH — SOCIAL STABILITY: SOCIAL INSECURITY: ARE YOU OR HAVE YOU BEEN THREATENED OR ABUSED PHYSICALLY, EMOTIONALLY, OR SEXUALLY BY ANYONE?: YES

## 2024-03-30 SDOH — SOCIAL STABILITY: SOCIAL INSECURITY: WERE YOU ABLE TO COMPLETE ALL THE BEHAVIORAL HEALTH SCREENINGS?: YES

## 2024-03-30 SDOH — SOCIAL STABILITY: SOCIAL INSECURITY: POSSIBLE ABUSE REPORTED TO:: SOCIAL SERVICES

## 2024-03-30 SDOH — SOCIAL STABILITY: SOCIAL INSECURITY: ABUSE: ADULT

## 2024-03-30 ASSESSMENT — LIFESTYLE VARIABLES
PAROXYSMAL SWEATS: NO SWEAT VISIBLE
ANXIETY: NO ANXIETY, AT EASE
TOTAL SCORE: 0
HOW OFTEN DURING THE LAST YEAR HAVE YOU HAD A FEELING OF GUILT OR REMORSE AFTER DRINKING: LESS THAN MONTHLY
HOW OFTEN DURING THE LAST YEAR HAVE YOU FOUND THAT YOU WERE NOT ABLE TO STOP DRINKING ONCE YOU HAD STARTED: LESS THAN MONTHLY
ANXIETY: NO ANXIETY, AT EASE
HEADACHE, FULLNESS IN HEAD: NOT PRESENT
ORIENTATION AND CLOUDING OF SENSORIUM: ORIENTED AND CAN DO SERIAL ADDITIONS
PAROXYSMAL SWEATS: NO SWEAT VISIBLE
AUDITORY DISTURBANCES: NOT PRESENT
HOW MANY STANDARD DRINKS CONTAINING ALCOHOL DO YOU HAVE ON A TYPICAL DAY: 5 OR 6
TREMOR: NO TREMOR
PAROXYSMAL SWEATS: NO SWEAT VISIBLE
AUDIT-C TOTAL SCORE: 10
EVER HAD A DRINK FIRST THING IN THE MORNING TO STEADY YOUR NERVES TO GET RID OF A HANGOVER: NO
NAUSEA AND VOMITING: NO NAUSEA AND NO VOMITING
HEADACHE, FULLNESS IN HEAD: NOT PRESENT
PRESCIPTION_ABUSE_PAST_12_MONTHS: NO
TREMOR: NO TREMOR
AUDIT TOTAL SCORE: 7
HEADACHE, FULLNESS IN HEAD: NOT PRESENT
HOW OFTEN DURING THE LAST YEAR HAVE YOU BEEN UNABLE TO REMEMBER WHAT HAPPENED THE NIGHT BEFORE BECAUSE YOU HAD BEEN DRINKING: NEVER
VISUAL DISTURBANCES: NOT PRESENT
AGITATION: NORMAL ACTIVITY
SKIP TO QUESTIONS 9-10: 0
VISUAL DISTURBANCES: NOT PRESENT
HOW OFTEN DURING THE LAST YEAR HAVE YOU FAILED TO DO WHAT WAS NORMALLY EXPECTED FROM YOU BECAUSE OF DRINKING: NEVER
AGITATION: NORMAL ACTIVITY
TOTAL SCORE: 0
PAROXYSMAL SWEATS: NO SWEAT VISIBLE
HOW OFTEN DURING THE LAST YEAR HAVE YOU NEEDED AN ALCOHOLIC DRINK FIRST THING IN THE MORNING TO GET YOURSELF GOING AFTER A NIGHT OF HEAVY DRINKING: LESS THAN MONTHLY
AUDITORY DISTURBANCES: NOT PRESENT
NAUSEA AND VOMITING: NO NAUSEA AND NO VOMITING
TREMOR: NO TREMOR
HAVE YOU EVER FELT YOU SHOULD CUT DOWN ON YOUR DRINKING: NO
SUBSTANCE_ABUSE_PAST_12_MONTHS: YES
PAROXYSMAL SWEATS: NO SWEAT VISIBLE
TOTAL SCORE: 0
HOW OFTEN DO YOU HAVE A DRINK CONTAINING ALCOHOL: 4 OR MORE TIMES A WEEK
HAVE PEOPLE ANNOYED YOU BY CRITICIZING YOUR DRINKING: NO
AGITATION: NORMAL ACTIVITY
TOTAL SCORE: 0
NAUSEA AND VOMITING: NO NAUSEA AND NO VOMITING
TREMOR: NO TREMOR
TREMOR: NO TREMOR
TOTAL SCORE: 0
TOTAL SCORE: 0
VISUAL DISTURBANCES: NOT PRESENT
HAVE YOU OR SOMEONE ELSE BEEN INJURED AS A RESULT OF YOUR DRINKING: NO
TOTAL SCORE: 0
NAUSEA AND VOMITING: NO NAUSEA AND NO VOMITING
TREMOR: NO TREMOR
ORIENTATION AND CLOUDING OF SENSORIUM: ORIENTED AND CAN DO SERIAL ADDITIONS
AUDITORY DISTURBANCES: NOT PRESENT
TOTAL SCORE: 0
HEADACHE, FULLNESS IN HEAD: NOT PRESENT
HOW OFTEN DO YOU HAVE 6 OR MORE DRINKS ON ONE OCCASION: DAILY OR ALMOST DAILY
NAUSEA AND VOMITING: NO NAUSEA AND NO VOMITING
PAROXYSMAL SWEATS: NO SWEAT VISIBLE
VISUAL DISTURBANCES: NOT PRESENT
PAROXYSMAL SWEATS: NO SWEAT VISIBLE
HAS A RELATIVE, FRIEND, DOCTOR, OR ANOTHER HEALTH PROFESSIONAL EXPRESSED CONCERN ABOUT YOUR DRINKING OR SUGGESTED YOU CUT DOWN: YES, DURING THE LAST YEAR
AUDITORY DISTURBANCES: NOT PRESENT
EVER FELT BAD OR GUILTY ABOUT YOUR DRINKING: NO
AUDITORY DISTURBANCES: NOT PRESENT
ANXIETY: NO ANXIETY, AT EASE
AGITATION: NORMAL ACTIVITY
VISUAL DISTURBANCES: NOT PRESENT
TREMOR: NO TREMOR
ORIENTATION AND CLOUDING OF SENSORIUM: ORIENTED AND CAN DO SERIAL ADDITIONS
AUDITORY DISTURBANCES: NOT PRESENT
AGITATION: NORMAL ACTIVITY
ORIENTATION AND CLOUDING OF SENSORIUM: ORIENTED AND CAN DO SERIAL ADDITIONS
ANXIETY: NO ANXIETY, AT EASE
ORIENTATION AND CLOUDING OF SENSORIUM: ORIENTED AND CAN DO SERIAL ADDITIONS
ANXIETY: NO ANXIETY, AT EASE
NAUSEA AND VOMITING: NO NAUSEA AND NO VOMITING
ORIENTATION AND CLOUDING OF SENSORIUM: ORIENTED AND CAN DO SERIAL ADDITIONS
AUDIT-C TOTAL SCORE: 10
HEADACHE, FULLNESS IN HEAD: NOT PRESENT
HEADACHE, FULLNESS IN HEAD: NOT PRESENT
AGITATION: NORMAL ACTIVITY
ANXIETY: NO ANXIETY, AT EASE
AGITATION: NORMAL ACTIVITY
ANXIETY: NO ANXIETY, AT EASE
AUDIT TOTAL SCORE: 17
AUDITORY DISTURBANCES: NOT PRESENT
ORIENTATION AND CLOUDING OF SENSORIUM: ORIENTED AND CAN DO SERIAL ADDITIONS
VISUAL DISTURBANCES: NOT PRESENT
HEADACHE, FULLNESS IN HEAD: NOT PRESENT
VISUAL DISTURBANCES: NOT PRESENT
NAUSEA AND VOMITING: NO NAUSEA AND NO VOMITING

## 2024-03-30 ASSESSMENT — ACTIVITIES OF DAILY LIVING (ADL)
PATIENT'S MEMORY ADEQUATE TO SAFELY COMPLETE DAILY ACTIVITIES?: YES
FEEDING YOURSELF: INDEPENDENT
TOILETING: INDEPENDENT
DRESSING YOURSELF: INDEPENDENT
ADEQUATE_TO_COMPLETE_ADL: YES
LACK_OF_TRANSPORTATION: NO
WALKS IN HOME: INDEPENDENT
BATHING: INDEPENDENT
GROOMING: INDEPENDENT
JUDGMENT_ADEQUATE_SAFELY_COMPLETE_DAILY_ACTIVITIES: YES
HEARING - LEFT EAR: FUNCTIONAL
HEARING - RIGHT EAR: FUNCTIONAL

## 2024-03-30 ASSESSMENT — ENCOUNTER SYMPTOMS
HEMATOLOGIC/LYMPHATIC NEGATIVE: 1
VOMITING: 0
ALLERGIC/IMMUNOLOGIC NEGATIVE: 1
MUSCULOSKELETAL NEGATIVE: 1
CARDIOVASCULAR NEGATIVE: 1
CONSTIPATION: 0
BLOOD IN STOOL: 1
EYES NEGATIVE: 1
ABDOMINAL PAIN: 0
DIARRHEA: 1
ACTIVITY CHANGE: 0
APPETITE CHANGE: 0
ABDOMINAL DISTENTION: 0
RESPIRATORY NEGATIVE: 1
DYSPHORIC MOOD: 1
ENDOCRINE NEGATIVE: 1
FEVER: 0
NAUSEA: 0
NEUROLOGICAL NEGATIVE: 1
CHILLS: 0

## 2024-03-30 ASSESSMENT — PAIN DESCRIPTION - PAIN TYPE: TYPE: ACUTE PAIN

## 2024-03-30 ASSESSMENT — PAIN SCALES - GENERAL: PAINLEVEL_OUTOF10: 3

## 2024-03-30 ASSESSMENT — COGNITIVE AND FUNCTIONAL STATUS - GENERAL
MOBILITY SCORE: 24
PATIENT BASELINE BEDBOUND: NO
DAILY ACTIVITIY SCORE: 24

## 2024-03-30 ASSESSMENT — PATIENT HEALTH QUESTIONNAIRE - PHQ9
2. FEELING DOWN, DEPRESSED OR HOPELESS: SEVERAL DAYS
1. LITTLE INTEREST OR PLEASURE IN DOING THINGS: SEVERAL DAYS
SUM OF ALL RESPONSES TO PHQ9 QUESTIONS 1 & 2: 2

## 2024-03-30 ASSESSMENT — COLUMBIA-SUICIDE SEVERITY RATING SCALE - C-SSRS
6. HAVE YOU EVER DONE ANYTHING, STARTED TO DO ANYTHING, OR PREPARED TO DO ANYTHING TO END YOUR LIFE?: NO
1. IN THE PAST MONTH, HAVE YOU WISHED YOU WERE DEAD OR WISHED YOU COULD GO TO SLEEP AND NOT WAKE UP?: NO
2. HAVE YOU ACTUALLY HAD ANY THOUGHTS OF KILLING YOURSELF?: NO

## 2024-03-30 ASSESSMENT — PAIN DESCRIPTION - LOCATION: LOCATION: ABDOMEN

## 2024-03-30 ASSESSMENT — PAIN - FUNCTIONAL ASSESSMENT: PAIN_FUNCTIONAL_ASSESSMENT: 0-10

## 2024-03-30 NOTE — H&P
"History Of Present Illness  Aguilar Wills is a 48 y.o. male with history of HTN, substance use disorder with alcohol (drinks a fifth of vodka daily, last drink on 3/28) and alcohol induced gastritis, as well as bipolar depression, and 5 previous suicide attempts, presents for chief complaint of blood in stool. Started yesterday morning, has had multiple bowel movements with bright red blood and some \"dark spots\" in his stool. Also endorses some recent loose stools with incontinence due to urgency and not being able to make it to the restroom. This is all accompanied with lower abdominal pain, which he currently denies. Denies changes in urination. No nausea or vomiting. Was recently discharged from a psychiatric facility 2 weeks ago, where he was admitted for suicide attempt (took \"a whole bottle of ASA\", unclear how many pills). Denies suicidal ideation, homicidal ideation at this time. Denies any chest pain, SOB, back pain, fever, chills, recent illnesses, sick contacts, or any recent travel.     Concern for acute blood loss since BRBPR and ~4 point Hg drop in past 2 weeks. Admitted to hospitalist team D for further workup.      Past Medical History  Past Medical History:   Diagnosis Date    Hypertension     Pancreatitis        Surgical History  No past surgical history on file.     Social History  He reports that he has been smoking cigarettes. He has been smoking an average of 1 PPD since the age of 12. He has never used smokeless tobacco. Denies illicit drug use. Alcohol use disorder, normally drinks a fifth of vodka daily; last drink 3/28.    Family History  No family history on file.     Allergies  Penicillins    Review of Systems   Constitutional:  Negative for activity change, appetite change, chills and fever.   HENT: Negative.     Eyes: Negative.    Respiratory: Negative.     Cardiovascular: Negative.    Gastrointestinal:  Positive for blood in stool and diarrhea. Negative for abdominal distention, " "abdominal pain, constipation, nausea and vomiting.   Endocrine: Negative.    Genitourinary: Negative.    Musculoskeletal: Negative.    Skin: Negative.    Allergic/Immunologic: Negative.    Neurological: Negative.    Hematological: Negative.    Psychiatric/Behavioral:  Positive for dysphoric mood. Negative for self-injury and suicidal ideas.      Physical Exam  Vitals reviewed.   Constitutional:       General: He is not in acute distress.     Appearance: Normal appearance. He is ill-appearing.   HENT:      Head: Normocephalic and atraumatic.      Right Ear: External ear normal.      Left Ear: External ear normal.      Mouth/Throat:      Mouth: Mucous membranes are moist.      Pharynx: Oropharynx is clear.   Eyes:      Extraocular Movements: Extraocular movements intact.      Pupils: Pupils are equal, round, and reactive to light.   Cardiovascular:      Rate and Rhythm: Normal rate and regular rhythm.      Pulses: Normal pulses.   Pulmonary:      Effort: Pulmonary effort is normal.   Abdominal:      General: Abdomen is flat. There is no distension.      Palpations: Abdomen is soft.      Tenderness: There is no abdominal tenderness. There is no guarding.   Musculoskeletal:         General: No swelling or tenderness.   Skin:     General: Skin is warm and dry.      Capillary Refill: Capillary refill takes 2 to 3 seconds.   Neurological:      General: No focal deficit present.      Mental Status: He is alert and oriented to person, place, and time.   Psychiatric:         Thought Content: Thought content normal.       Last Recorded Vitals  Blood pressure (!) 175/105, pulse 66, temperature 36.7 °C (98 °F), temperature source Oral, resp. rate 16, height 1.702 m (5' 7\"), weight 59 kg (130 lb), SpO2 98 %.    Relevant Results  Results for orders placed or performed during the hospital encounter of 03/30/24 (from the past 24 hour(s))   CBC and Auto Differential   Result Value Ref Range    WBC 4.9 4.4 - 11.3 x10*3/uL    nRBC 1.6 " (H) 0.0 - 0.0 /100 WBCs    RBC 2.98 (L) 4.50 - 5.90 x10*6/uL    Hemoglobin 10.7 (L) 13.5 - 17.5 g/dL    Hematocrit 28.8 (L) 41.0 - 52.0 %    MCV 97 80 - 100 fL    MCH 35.9 (H) 26.0 - 34.0 pg    MCHC 37.2 (H) 32.0 - 36.0 g/dL    RDW 20.5 (H) 11.5 - 14.5 %    Platelets 82 (L) 150 - 450 x10*3/uL    Neutrophils % 55.6 40.0 - 80.0 %    Immature Granulocytes %, Automated 0.4 0.0 - 0.9 %    Lymphocytes % 36.5 13.0 - 44.0 %    Monocytes % 6.1 2.0 - 10.0 %    Eosinophils % 0.8 0.0 - 6.0 %    Basophils % 0.6 0.0 - 2.0 %    Neutrophils Absolute 2.74 1.20 - 7.70 x10*3/uL    Immature Granulocytes Absolute, Automated 0.02 0.00 - 0.70 x10*3/uL    Lymphocytes Absolute 1.80 1.20 - 4.80 x10*3/uL    Monocytes Absolute 0.30 0.10 - 1.00 x10*3/uL    Eosinophils Absolute 0.04 0.00 - 0.70 x10*3/uL    Basophils Absolute 0.03 0.00 - 0.10 x10*3/uL   Magnesium   Result Value Ref Range    Magnesium 1.37 (L) 1.60 - 2.40 mg/dL   Lipase   Result Value Ref Range    Lipase 24 9 - 82 U/L   Hepatic function panel   Result Value Ref Range    Albumin 4.8 3.4 - 5.0 g/dL    Bilirubin, Total 1.1 0.0 - 1.2 mg/dL    Bilirubin, Direct 0.2 0.0 - 0.3 mg/dL    Alkaline Phosphatase 79 33 - 120 U/L    ALT 7 (L) 10 - 52 U/L    AST 33 9 - 39 U/L    Total Protein 7.7 6.4 - 8.2 g/dL   BLOOD GAS VENOUS FULL PANEL   Result Value Ref Range    POCT pH, Venous 7.34 7.33 - 7.43 pH    POCT pCO2, Venous 57 (H) 41 - 51 mm Hg    POCT pO2, Venous 22 (L) 35 - 45 mm Hg    POCT SO2, Venous 21 (L) 45 - 75 %    POCT Oxy Hemoglobin, Venous 20.3 (L) 45.0 - 75.0 %    POCT Hematocrit Calculated, Venous 34.0 (L) 41.0 - 52.0 %    POCT Sodium, Venous 138 136 - 145 mmol/L    POCT Potassium, Venous 3.0 (L) 3.5 - 5.3 mmol/L    POCT Chloride, Venous 100 98 - 107 mmol/L    POCT Ionized Calicum, Venous 1.13 1.10 - 1.33 mmol/L    POCT Glucose, Venous 125 (H) 74 - 99 mg/dL    POCT Lactate, Venous 2.6 (H) 0.4 - 2.0 mmol/L    POCT Base Excess, Venous 3.9 (H) -2.0 - 3.0 mmol/L    POCT HCO3 Calculated,  Venous 30.8 (H) 22.0 - 26.0 mmol/L    POCT Hemoglobin, Venous 11.2 (L) 13.5 - 17.5 g/dL    POCT Anion Gap, Venous 10.0 10.0 - 25.0 mmol/L    Patient Temperature 37.0 degrees Celsius    FiO2 21 %   Type And Screen   Result Value Ref Range    ABO TYPE O     Rh TYPE POS     ANTIBODY SCREEN NEG    Coagulation Screen   Result Value Ref Range    Protime 10.9 9.8 - 12.8 seconds    INR 1.0 0.9 - 1.1    aPTT 24 (L) 27 - 38 seconds   Phosphorus   Result Value Ref Range    Phosphorus 2.7 2.5 - 4.9 mg/dL   Basic Metabolic Panel   Result Value Ref Range    Glucose 118 (H) 74 - 99 mg/dL    Sodium 138 136 - 145 mmol/L    Potassium 3.0 (L) 3.5 - 5.3 mmol/L    Chloride 100 98 - 107 mmol/L    Bicarbonate 26 21 - 32 mmol/L    Anion Gap 15 10 - 20 mmol/L    Urea Nitrogen 8 6 - 23 mg/dL    Creatinine 0.59 0.50 - 1.30 mg/dL    eGFR >90 >60 mL/min/1.73m*2    Calcium 9.0 8.6 - 10.6 mg/dL   Morphology   Result Value Ref Range    RBC Morphology See Below     Polychromasia Mild     Target Cells Few     Stomatocytes Few    Magnesium   Result Value Ref Range    Magnesium 1.35 (L) 1.60 - 2.40 mg/dL   Ethanol   Result Value Ref Range    Alcohol <10 <=10 mg/dL   Urinalysis with Reflex Culture and Microscopic   Result Value Ref Range    Color, Urine Yellow Light-Yellow, Yellow, Dark-Yellow    Appearance, Urine Clear Clear    Specific Gravity, Urine 1.024 1.005 - 1.035    pH, Urine 6.5 5.0, 5.5, 6.0, 6.5, 7.0, 7.5, 8.0    Protein, Urine NEGATIVE NEGATIVE, 10 (TRACE), 20 (TRACE) mg/dL    Glucose, Urine Normal Normal mg/dL    Blood, Urine NEGATIVE NEGATIVE    Ketones, Urine NEGATIVE NEGATIVE mg/dL    Bilirubin, Urine NEGATIVE NEGATIVE    Urobilinogen, Urine 2 (1+) (A) Normal mg/dL    Nitrite, Urine NEGATIVE NEGATIVE    Leukocyte Esterase, Urine NEGATIVE NEGATIVE     === 03/30/24 ===    CT ABDOMEN PELVIS W IV CONTRAST    - Impression -  1. Mild bowel wall thickening involving the rectum and sigmoid colon  with minimal surrounding stranding and trace  amount of pelvic free  fluid. Findings are nonspecific but can be seen with mild colitis.  Clinical correlation is recommended. No evidence of pneumatosis,  pneumoperitoneum or intraluminal contrast extravasation to suggest  active bleeding in the sigmoid colon.  2. Additional chronic findings as noted above.    I personally reviewed the images/study and I agree with the findings  as stated. This study was interpreted at [facility], [place], [place].    MACRO:  None.    Signed by: Cody Goff 3/30/2024 12:27 PM  Dictation workstation:   [id number]       Assessment/Plan   Active Problems:    Lower GI bleed    Alcohol use disorder    Bipolar depression (CMS/HCC)    48-year-old male with history of HTN, substance use disorder with alcohol and alcohol induced gastritis, as well as depression, presents for chief complaint of blood in stool, concerning for possible lower GI bleed. Vital signs reviewed, significant for hypertension at 185/110. Denies headache or vision changes or dizziness. He is overall well-appearing and in no apparent distress. Speaks full sentences without difficulty. Diagnostic testing performed. Rectal exam done in the ED showed no dmitry bleeding. No hemorrhoids noted internally or externally. He did present with a moderate amount of apparent hematochezia stool while in the hospital here. He did recently stop drinking alcohol with his last drink on Thursday. Denies any history of alcohol withdrawal but WA protocol initiated. Exam overall concerning for lower GI bleed.   CBC with differential shows mild anemia with hemoglobin 10.7 and hematocrit 28.8. These are below his recent baseline between 12 and 14 hemoglobin. Platelets low at 82 as well. They have been as low as 73 4 months ago. Hepatic function panel and coagulation screen unremarkable. BMP did show potassium low at 3.0. Repleted with 40 p.o. K and 20 IV K. Magnesium also low at 1.35. This was  repleted as well.   Type and screen performed. Lipase within normal ranges. Phosphorus within normal ranges. Urinalysis shows no evidence of UTI or bleeding. Venous full panel showed anemia with no acidosis but slightly elevated lactate at 2.6. Was given a liter of LR. CIWA orders were placed and patient was given folic acid, multivitamin, thiamine. Pepcid was given for symptom management. CT abdomen pelvis showed mild bowel wall thickening involving the rectum and sigmoid colon with minimal surrounding stranding and trace amount of pelvic free fluid. Concerning for mild colitis. Patient HDS and appropriate for regular nursing floor. Detailed plan below:    #Lower GI bleed  - continue to trend CBC (most recent Hg 10.7 from 14.0 on 2/3)  - transfuse for Hg<7, type and screen collected 3/30  - GI consult placed, appreciate recs    #AUD  - continue to monitor RFP, replete electrolytes as needed.  - started on folic acid, MV, and thiamine  - CIWA protocol activated    #bipolar depression   #previous suicide attempts  - need to complete pharm med rec  - hydroxyzine 50mg prn anxiety  - no current SI or HI.     Plan not finalized until attested by attending physician.    Annika Baker,   Family Medicine, PGY-1

## 2024-03-30 NOTE — CARE PLAN
The patient's goals for the shift include n/a    The clinical goals for the shift include resolve bloody stool

## 2024-03-30 NOTE — ED PROVIDER NOTES
"HPI   Chief Complaint   Patient presents with    Black or Bloody Stool       48-year-old male with history of HTN, substance use disorder with alcohol and alcohol induced gastritis, as well as depression, presents for chief complaint of blood in stool.  Starting yesterday morning he has had multiple bowel movements with bright red blood and some \"dark spots\" in his stool.  Also endorses some recent loose stools with incontinence due to urgency and not being able to make it to the restroom.  This is all accompanied with lower abdominal pain.  Atraumatic.  3/10 currently and bilateral lower quadrants.  Denies changes in urination.  No nausea or vomiting.  Was recently discharged from a psychiatric facility 2 weeks ago.  Denies any chest pain or back pain.  Endorses recently straining to have bowel movements but has never been told he has a hemorrhoid.  Denies fever or chills.                          [place] Coma Scale Score: 15                     Patient History   Past Medical History:   Diagnosis Date    Hypertension     Pancreatitis      No past surgical history on file.  No family history on file.  Social History     Tobacco Use    Smoking status: Every Day     Packs/day: 1     Types: Cigarettes    Smokeless tobacco: Never   Substance Use Topics    Alcohol use: Not on file    Drug use: Not on file       Physical Exam   ED Triage Vitals [03/30/24 0904]   Temperature Heart Rate Respirations BP   36.7 °C (98 °F) 86 16 (!) 185/110      Pulse Ox Temp Source Heart Rate Source Patient Position   100 % Oral Monitor --      BP Location FiO2 (%)     -- --       Physical Exam  Constitutional:       Appearance: Normal appearance.   HENT:      Head: Normocephalic and atraumatic.      Mouth/Throat:      Mouth: Mucous membranes are moist.      Pharynx: Oropharynx is clear.   Eyes:      Extraocular Movements: Extraocular movements intact.      Conjunctiva/sclera: Conjunctivae normal.      Pupils: Pupils are equal, round, and " reactive to light.   Cardiovascular:      Rate and Rhythm: Normal rate and regular rhythm.      Pulses: Normal pulses.      Heart sounds: Normal heart sounds.   Pulmonary:      Effort: Pulmonary effort is normal.      Breath sounds: Normal breath sounds.   Abdominal:      General: Abdomen is flat. There is no distension.      Palpations: Abdomen is soft.      Tenderness: There is abdominal tenderness in the right lower quadrant and left lower quadrant. There is no right CVA tenderness, left CVA tenderness or guarding. Negative signs include Thornton's sign and McBurney's sign.   Genitourinary:     Prostate: Normal.      Rectum: Normal.   Musculoskeletal:         General: Normal range of motion.      Cervical back: Normal range of motion and neck supple.   Skin:     General: Skin is warm and dry.      Capillary Refill: Capillary refill takes less than 2 seconds.   Neurological:      General: No focal deficit present.      Mental Status: He is alert and oriented to person, place, and time.   Psychiatric:         Mood and Affect: Mood normal.         Behavior: Behavior normal.         Thought Content: Thought content normal.         Judgment: Judgment normal.         ED Course & MDM   ED Course as of 03/30/24 1257   Sat Mar 30, 2024   1255 Platelets(!): 82 [JS]      ED Course User Index  [JS] Valentino Aponte, APRN-CNP       Medical Decision Making  48-year-old male with history of HTN, substance use disorder with alcohol and alcohol induced gastritis, as well as depression, presents for chief complaint of blood in stool, concerning for possible lower GI bleed.  Vital signs reviewed, significant for hypertension at 185/110.  Denies headache or vision changes or dizziness.  He is overall well-appearing and in no apparent distress.  Speaks full sentences without difficulty.  Diagnostic testing performed.  Rectal exam showed no dmitry bleeding.  No hemorrhoids noted internally or externally.  Normal exam.  He did present with  a moderate amount of apparent hematochezia stool while in the hospital here.  He did recently stop drinking alcohol with his last drink on Thursday.  Denies any history of alcohol withdrawal but CIWA started to be safe.  Exam was concerning for lower GI bleed.  We should consider things like appendicitis as well as diverticulitis. CBC with differential shows mild anemia with hemoglobin 10.7 and hematocrit 28.8.  These are below his recent baseline between 12 and 14 hemoglobin.  Platelets low at 82 as well.  They have been as low as 73 4 months ago.  Hepatic function panel and coagulation screen unremarkable.  BMP did show potassium low at 3.0.  Repleted with 40 p.o. K and 20 IV K.  Magnesium also low at 1.35.  This was repleted as well.  Type and screen performed.  Lipase within normal ranges.  Phosphorus within normal ranges.  Urinalysis shows no evidence of UTI or bleeding.  Venous full panel showed anemia with no acidosis but slightly elevated lactate at 2.6.  Was given a liter of LR.  CIWA orders were placed and patient was given folic acid, multivitamin, thiamine.  Pepcid was given for symptom management.  On reevaluation the patient endorses overall feeling improved but still has had multiple bloody bowel movements since arriving.  He would be in agreement with sticking around.  CT abdomen pelvis showed mild bowel wall thickening involving the rectum and sigmoid colon with minimal surrounding stranding and trace amount of pelvic free fluid.  Concerning for mild colitis.  Medicine team was able to accept the patient.  Patient in agreement with this plan.  Awaiting inpatient bed.        Procedure  Procedures     Valentino Aponte, NELLA-VIC  03/30/24 4951

## 2024-03-30 NOTE — ED TRIAGE NOTES
Pt arrived to ED via EMS from home c/o bloody diarrhea x2 days. Pt states he stopped drinking alcohol 3 days ago.

## 2024-03-31 LAB
ALBUMIN SERPL BCP-MCNC: 4 G/DL (ref 3.4–5)
ANION GAP SERPL CALC-SCNC: 14 MMOL/L (ref 10–20)
BUN SERPL-MCNC: 4 MG/DL (ref 6–23)
CALCIUM SERPL-MCNC: 8.8 MG/DL (ref 8.6–10.6)
CHLORIDE SERPL-SCNC: 101 MMOL/L (ref 98–107)
CO2 SERPL-SCNC: 25 MMOL/L (ref 21–32)
CREAT SERPL-MCNC: 0.61 MG/DL (ref 0.5–1.3)
EGFRCR SERPLBLD CKD-EPI 2021: >90 ML/MIN/1.73M*2
ERYTHROCYTE [DISTWIDTH] IN BLOOD BY AUTOMATED COUNT: 20.2 % (ref 11.5–14.5)
ERYTHROCYTE [DISTWIDTH] IN BLOOD BY AUTOMATED COUNT: 20.4 % (ref 11.5–14.5)
ERYTHROCYTE [DISTWIDTH] IN BLOOD BY AUTOMATED COUNT: 20.6 % (ref 11.5–14.5)
GLUCOSE SERPL-MCNC: 82 MG/DL (ref 74–99)
HCT VFR BLD AUTO: 24.5 % (ref 41–52)
HCT VFR BLD AUTO: 24.9 % (ref 41–52)
HCT VFR BLD AUTO: 25.3 % (ref 41–52)
HGB BLD-MCNC: 8.8 G/DL (ref 13.5–17.5)
HGB BLD-MCNC: 8.9 G/DL (ref 13.5–17.5)
HGB BLD-MCNC: 9.3 G/DL (ref 13.5–17.5)
MAGNESIUM SERPL-MCNC: 1.67 MG/DL (ref 1.6–2.4)
MCH RBC QN AUTO: 35.5 PG (ref 26–34)
MCH RBC QN AUTO: 35.7 PG (ref 26–34)
MCH RBC QN AUTO: 36.3 PG (ref 26–34)
MCHC RBC AUTO-ENTMCNC: 35.3 G/DL (ref 32–36)
MCHC RBC AUTO-ENTMCNC: 36.3 G/DL (ref 32–36)
MCHC RBC AUTO-ENTMCNC: 36.8 G/DL (ref 32–36)
MCV RBC AUTO: 100 FL (ref 80–100)
MCV RBC AUTO: 98 FL (ref 80–100)
MCV RBC AUTO: 99 FL (ref 80–100)
NRBC BLD-RTO: 0.6 /100 WBCS (ref 0–0)
NRBC BLD-RTO: 0.7 /100 WBCS (ref 0–0)
NRBC BLD-RTO: 0.9 /100 WBCS (ref 0–0)
PHOSPHATE SERPL-MCNC: 2.9 MG/DL (ref 2.5–4.9)
PLATELET # BLD AUTO: 71 X10*3/UL (ref 150–450)
PLATELET # BLD AUTO: 71 X10*3/UL (ref 150–450)
PLATELET # BLD AUTO: 94 X10*3/UL (ref 150–450)
POTASSIUM SERPL-SCNC: 3.5 MMOL/L (ref 3.5–5.3)
RBC # BLD AUTO: 2.48 X10*6/UL (ref 4.5–5.9)
RBC # BLD AUTO: 2.49 X10*6/UL (ref 4.5–5.9)
RBC # BLD AUTO: 2.56 X10*6/UL (ref 4.5–5.9)
SODIUM SERPL-SCNC: 136 MMOL/L (ref 136–145)
WBC # BLD AUTO: 4.2 X10*3/UL (ref 4.4–11.3)
WBC # BLD AUTO: 4.5 X10*3/UL (ref 4.4–11.3)
WBC # BLD AUTO: 4.7 X10*3/UL (ref 4.4–11.3)

## 2024-03-31 PROCEDURE — 36415 COLL VENOUS BLD VENIPUNCTURE: CPT

## 2024-03-31 PROCEDURE — 87661 TRICHOMONAS VAGINALIS AMPLIF: CPT | Performed by: STUDENT IN AN ORGANIZED HEALTH CARE EDUCATION/TRAINING PROGRAM

## 2024-03-31 PROCEDURE — 99232 SBSQ HOSP IP/OBS MODERATE 35: CPT | Performed by: STUDENT IN AN ORGANIZED HEALTH CARE EDUCATION/TRAINING PROGRAM

## 2024-03-31 PROCEDURE — 1100000001 HC PRIVATE ROOM DAILY

## 2024-03-31 PROCEDURE — 36415 COLL VENOUS BLD VENIPUNCTURE: CPT | Performed by: STUDENT IN AN ORGANIZED HEALTH CARE EDUCATION/TRAINING PROGRAM

## 2024-03-31 PROCEDURE — 87591 N.GONORRHOEAE DNA AMP PROB: CPT | Performed by: STUDENT IN AN ORGANIZED HEALTH CARE EDUCATION/TRAINING PROGRAM

## 2024-03-31 PROCEDURE — 83735 ASSAY OF MAGNESIUM: CPT

## 2024-03-31 PROCEDURE — 80069 RENAL FUNCTION PANEL: CPT

## 2024-03-31 PROCEDURE — 87491 CHLMYD TRACH DNA AMP PROBE: CPT | Performed by: STUDENT IN AN ORGANIZED HEALTH CARE EDUCATION/TRAINING PROGRAM

## 2024-03-31 PROCEDURE — 2500000004 HC RX 250 GENERAL PHARMACY W/ HCPCS (ALT 636 FOR OP/ED): Performed by: STUDENT IN AN ORGANIZED HEALTH CARE EDUCATION/TRAINING PROGRAM

## 2024-03-31 PROCEDURE — C9113 INJ PANTOPRAZOLE SODIUM, VIA: HCPCS | Performed by: STUDENT IN AN ORGANIZED HEALTH CARE EDUCATION/TRAINING PROGRAM

## 2024-03-31 PROCEDURE — 85027 COMPLETE CBC AUTOMATED: CPT | Performed by: STUDENT IN AN ORGANIZED HEALTH CARE EDUCATION/TRAINING PROGRAM

## 2024-03-31 PROCEDURE — 2500000001 HC RX 250 WO HCPCS SELF ADMINISTERED DRUGS (ALT 637 FOR MEDICARE OP)

## 2024-03-31 RX ORDER — PANTOPRAZOLE SODIUM 40 MG/10ML
40 INJECTION, POWDER, LYOPHILIZED, FOR SOLUTION INTRAVENOUS 2 TIMES DAILY
Status: DISCONTINUED | OUTPATIENT
Start: 2024-03-31 | End: 2024-04-03 | Stop reason: HOSPADM

## 2024-03-31 RX ADMIN — PANTOPRAZOLE SODIUM 40 MG: 40 INJECTION, POWDER, FOR SOLUTION INTRAVENOUS at 21:36

## 2024-03-31 RX ADMIN — THIAMINE HCL TAB 100 MG 100 MG: 100 TAB at 09:21

## 2024-03-31 RX ADMIN — Medication 1 TABLET: at 09:21

## 2024-03-31 RX ADMIN — PANTOPRAZOLE SODIUM 40 MG: 40 INJECTION, POWDER, FOR SOLUTION INTRAVENOUS at 09:21

## 2024-03-31 RX ADMIN — LOSARTAN POTASSIUM: 50 TABLET, FILM COATED ORAL at 09:19

## 2024-03-31 RX ADMIN — FOLIC ACID 1 MG: 1 TABLET ORAL at 09:20

## 2024-03-31 ASSESSMENT — LIFESTYLE VARIABLES
NAUSEA AND VOMITING: NO NAUSEA AND NO VOMITING
ORIENTATION AND CLOUDING OF SENSORIUM: ORIENTED AND CAN DO SERIAL ADDITIONS
ORIENTATION AND CLOUDING OF SENSORIUM: ORIENTED AND CAN DO SERIAL ADDITIONS
ANXIETY: NO ANXIETY, AT EASE
VISUAL DISTURBANCES: NOT PRESENT
TREMOR: NO TREMOR
NAUSEA AND VOMITING: NO NAUSEA AND NO VOMITING
PAROXYSMAL SWEATS: NO SWEAT VISIBLE
PAROXYSMAL SWEATS: NO SWEAT VISIBLE
ORIENTATION AND CLOUDING OF SENSORIUM: ORIENTED AND CAN DO SERIAL ADDITIONS
NAUSEA AND VOMITING: NO NAUSEA AND NO VOMITING
PAROXYSMAL SWEATS: NO SWEAT VISIBLE
HEADACHE, FULLNESS IN HEAD: NOT PRESENT
ANXIETY: NO ANXIETY, AT EASE
AUDITORY DISTURBANCES: NOT PRESENT
AUDITORY DISTURBANCES: NOT PRESENT
ANXIETY: NO ANXIETY, AT EASE
TREMOR: NO TREMOR
AUDITORY DISTURBANCES: NOT PRESENT
NAUSEA AND VOMITING: NO NAUSEA AND NO VOMITING
VISUAL DISTURBANCES: NOT PRESENT
TREMOR: NO TREMOR
ANXIETY: NO ANXIETY, AT EASE
AGITATION: NORMAL ACTIVITY
TOTAL SCORE: 0
TOTAL SCORE: 0
NAUSEA AND VOMITING: NO NAUSEA AND NO VOMITING
HEADACHE, FULLNESS IN HEAD: NOT PRESENT
ANXIETY: NO ANXIETY, AT EASE
AGITATION: NORMAL ACTIVITY
VISUAL DISTURBANCES: NOT PRESENT
AGITATION: NORMAL ACTIVITY
AGITATION: NORMAL ACTIVITY
ORIENTATION AND CLOUDING OF SENSORIUM: ORIENTED AND CAN DO SERIAL ADDITIONS
TOTAL SCORE: 0
TREMOR: NO TREMOR
HEADACHE, FULLNESS IN HEAD: NOT PRESENT
VISUAL DISTURBANCES: NOT PRESENT
PULSE: 74
VISUAL DISTURBANCES: NOT PRESENT
NAUSEA AND VOMITING: NO NAUSEA AND NO VOMITING
TOTAL SCORE: 0
AUDITORY DISTURBANCES: NOT PRESENT
AUDITORY DISTURBANCES: NOT PRESENT
ANXIETY: NO ANXIETY, AT EASE
TOTAL SCORE: 0
VISUAL DISTURBANCES: NOT PRESENT
TOTAL SCORE: 0
AGITATION: NORMAL ACTIVITY
BLOOD PRESSURE: 166/90
ORIENTATION AND CLOUDING OF SENSORIUM: ORIENTED AND CAN DO SERIAL ADDITIONS
PAROXYSMAL SWEATS: NO SWEAT VISIBLE
HEADACHE, FULLNESS IN HEAD: NOT PRESENT
TREMOR: NO TREMOR
HEADACHE, FULLNESS IN HEAD: NOT PRESENT
TREMOR: NO TREMOR
AGITATION: NORMAL ACTIVITY
PAROXYSMAL SWEATS: NO SWEAT VISIBLE
PAROXYSMAL SWEATS: NO SWEAT VISIBLE
ORIENTATION AND CLOUDING OF SENSORIUM: ORIENTED AND CAN DO SERIAL ADDITIONS
AUDITORY DISTURBANCES: NOT PRESENT
HEADACHE, FULLNESS IN HEAD: NOT PRESENT

## 2024-03-31 ASSESSMENT — COGNITIVE AND FUNCTIONAL STATUS - GENERAL
MOBILITY SCORE: 23
DAILY ACTIVITIY SCORE: 24
CLIMB 3 TO 5 STEPS WITH RAILING: A LITTLE
DAILY ACTIVITIY SCORE: 24
CLIMB 3 TO 5 STEPS WITH RAILING: A LITTLE
MOBILITY SCORE: 23
MOBILITY SCORE: 24

## 2024-03-31 ASSESSMENT — PAIN SCALES - GENERAL
PAINLEVEL_OUTOF10: 0 - NO PAIN

## 2024-03-31 ASSESSMENT — ENCOUNTER SYMPTOMS
BLOOD IN STOOL: 1
VOMITING: 0
NAUSEA: 0
DIARRHEA: 1
ABDOMINAL PAIN: 0

## 2024-03-31 ASSESSMENT — PAIN SCALES - WONG BAKER
WONGBAKER_NUMERICALRESPONSE: NO HURT
WONGBAKER_NUMERICALRESPONSE: NO HURT

## 2024-03-31 NOTE — PROGRESS NOTES
"48 y.o. male admitted for Lower GI bleed [K92.2]. Today is Hospital Day 1.    Subjective   Patient frustrated that he's still not able to eat, but understanding that this is for his safety until we determine a plan for endoscopy. Has not had a bowel movement since 3/30 around 4pm, at which time there was bright red blood. Notes that he's lost 30 pounds in the past few months, but he attributes this to lack of teeth, planning to get dentures soon. Reports that he normally drinks a fifth of liquor every day, but \"I quit on Thursday (3/28).\" Denies current abdominal pain, nausea, vomiting, headache, dizziness, vertigo, or weakness.       Objective     Scheduled Medications:   folic acid, 1 mg, oral, Daily  losartan 50 mg, hydroCHLOROthiazide 12.5 mg for Hyzaar 50/12.5, , oral, Daily  multivitamin with minerals, 1 tablet, oral, Daily  pantoprazole, 40 mg, intravenous, BID  thiamine, 100 mg, oral, Daily       Continuous Medications:         PRN Medications:   PRN medications: diazePAM, hydrOXYzine HCL    Dietary Orders (From admission, onward)       Start     Ordered    04/01/24 0001  Adult diet Clear Liquid  Diet effective midnight        Question:  Diet type  Answer:  Clear Liquid    03/31/24 1230    03/31/24 1230  Adult diet Regular  Diet effective now        Question:  Diet type  Answer:  Regular    03/31/24 1230                  Vitals:  Most Recent:  Vitals:    03/31/24 0900   BP: (!) 182/116   Pulse: 71   Resp: 18   Temp: 36.6 °C (97.9 °F)   SpO2: 100%       24hr Min/Max:  Temp  Min: 36.1 °C (97 °F)  Max: 37.3 °C (99.1 °F)  Pulse  Min: 60  Max: 85  BP  Min: 162/98  Max: 182/116  Resp  Min: 16  Max: 18  SpO2  Min: 98 %  Max: 100 %    Intake/Output x24h:    Intake/Output Summary (Last 24 hours) at 3/31/2024 1232  Last data filed at 3/31/2024 0535  Gross per 24 hour   Intake 50 ml   Output --   Net 50 ml        Physical Exam:  Physical Exam  Vitals reviewed.   Constitutional:       General: He is not in acute " distress.     Appearance: Normal appearance. He is not ill-appearing.   HENT:      Head: Normocephalic and atraumatic.      Nose: Nose normal.   Eyes:      Extraocular Movements: Extraocular movements intact.      Conjunctiva/sclera: Conjunctivae normal.   Cardiovascular:      Rate and Rhythm: Normal rate.   Pulmonary:      Effort: Pulmonary effort is normal. No respiratory distress.   Abdominal:      General: Abdomen is flat. There is no distension.      Palpations: Abdomen is soft.      Tenderness: There is no abdominal tenderness. There is no guarding or rebound.   Musculoskeletal:         General: No swelling. Normal range of motion.      Cervical back: Normal range of motion.   Skin:     Capillary Refill: Capillary refill takes less than 2 seconds.   Neurological:      General: No focal deficit present.      Mental Status: He is alert and oriented to person, place, and time. Mental status is at baseline.   Psychiatric:         Mood and Affect: Mood normal.         Behavior: Behavior normal.         Relevant Results  Results for orders placed or performed during the hospital encounter of 03/30/24 (from the past 24 hour(s))   Renal Function Panel   Result Value Ref Range    Glucose 94 74 - 99 mg/dL    Sodium 136 136 - 145 mmol/L    Potassium 3.3 (L) 3.5 - 5.3 mmol/L    Chloride 103 98 - 107 mmol/L    Bicarbonate 26 21 - 32 mmol/L    Anion Gap 10 10 - 20 mmol/L    Urea Nitrogen 5 (L) 6 - 23 mg/dL    Creatinine 0.51 0.50 - 1.30 mg/dL    eGFR >90 >60 mL/min/1.73m*2    Calcium 8.4 (L) 8.6 - 10.6 mg/dL    Phosphorus 2.5 2.5 - 4.9 mg/dL    Albumin 4.0 3.4 - 5.0 g/dL   Magnesium   Result Value Ref Range    Magnesium 1.74 1.60 - 2.40 mg/dL   CBC   Result Value Ref Range    WBC 4.7 4.4 - 11.3 x10*3/uL    nRBC 0.6 (H) 0.0 - 0.0 /100 WBCs    RBC 2.48 (L) 4.50 - 5.90 x10*6/uL    Hemoglobin 8.8 (L) 13.5 - 17.5 g/dL    Hematocrit 24.9 (L) 41.0 - 52.0 %     80 - 100 fL    MCH 35.5 (H) 26.0 - 34.0 pg    MCHC 35.3 32.0 -  "36.0 g/dL    RDW 20.2 (H) 11.5 - 14.5 %    Platelets 71 (L) 150 - 450 x10*3/uL   Renal function panel   Result Value Ref Range    Glucose 82 74 - 99 mg/dL    Sodium 136 136 - 145 mmol/L    Potassium 3.5 3.5 - 5.3 mmol/L    Chloride 101 98 - 107 mmol/L    Bicarbonate 25 21 - 32 mmol/L    Anion Gap 14 10 - 20 mmol/L    Urea Nitrogen 4 (L) 6 - 23 mg/dL    Creatinine 0.61 0.50 - 1.30 mg/dL    eGFR >90 >60 mL/min/1.73m*2    Calcium 8.8 8.6 - 10.6 mg/dL    Phosphorus 2.9 2.5 - 4.9 mg/dL    Albumin 4.0 3.4 - 5.0 g/dL   Magnesium   Result Value Ref Range    Magnesium 1.67 1.60 - 2.40 mg/dL   CBC   Result Value Ref Range    WBC 4.2 (L) 4.4 - 11.3 x10*3/uL    nRBC 0.7 (H) 0.0 - 0.0 /100 WBCs    RBC 2.49 (L) 4.50 - 5.90 x10*6/uL    Hemoglobin 8.9 (L) 13.5 - 17.5 g/dL    Hematocrit 24.5 (L) 41.0 - 52.0 %    MCV 98 80 - 100 fL    MCH 35.7 (H) 26.0 - 34.0 pg    MCHC 36.3 (H) 32.0 - 36.0 g/dL    RDW 20.6 (H) 11.5 - 14.5 %    Platelets 71 (L) 150 - 450 x10*3/uL          Assessment/Plan   Principal Problem:    Lower GI bleed  Active Problems:    Alcohol use disorder    Bipolar depression (CMS/HCC)    48-year-old male with history of HTN, EtOH abuse c/b gastritis and depression, presents for chief complaint of blood in stool (bright red and \"dark spots\"), concerning for possible lower GI bleed. Baseline Hgb 12-14, was 10.7 on admission and has downtrended again to 8.8. ENMANUEL in ED with BRBPR and no obvious hemorrhoids. Markedly hypertensive this admission, but asymptomatic, continuing home BP meds. CIWA protocol in effect. CT abd/pel with mild bowel wall thickening (rectum and sigmoid), consistent with mild colitis. Patient HDS and appropriate for regular nursing floor. Detailed plan below:     #GI bleed  - possibly mixed upper/lower, as patient reports both bright red stool and \"black spots,\" although predominantly bright red; chronic EtOH abuse increases risk of PUD or variceal bleeding, although no established history of cirrhosis " and no hematemesis  - patient can't remember ever having had a colonoscopy or EGD before  - baseline Hgb 12-14 -> 10.7 -> 8.8 // trend CBC BID  - T&S active until 4/2 @ 9am, transfuse for Hg<7  - pantoprazole 40mg IV BID  - GI consult placed, NPO pending recommendations  - also with new significant thrombocytopenia without other indicators for acute hepatic failure, will trend     #AUD  - continue to monitor RFP, replete electrolytes as needed.  - started on folic acid, MV, and thiamine  - [facility] protocol  - SW consulted     #bipolar depression   #previous suicide attempts  - hydroxyzine 50mg prn anxiety  - no current SI or HI, no indication for inpatient psychiatry consultation    # HTN  - continue home losartain 50mg and hydrochlorothiazide 12.5mg       Code Status: Full Code   Emergency Contact: Extended Emergency Contact Information  Primary Emergency Contact: Rosa Wills  Home Phone: [phone]  Relation: Parent  PCP: No Assigned PCP Generic Provider, MD    Patient seen and discussed with attending physician, Dr. Baker.  Plan preliminary until cosigned by attending physician.    Tessie Washington MD  Family Medicine PGY-3

## 2024-03-31 NOTE — CARE PLAN
The patient's goals for the shift include n/a    The clinical goals for the shift include patient will have no bloody mari during this shift

## 2024-03-31 NOTE — CARE PLAN
The patient's goals for the shift include n/a      Problem: Pain  Goal: My pain/discomfort is manageable  Outcome: Progressing     Problem: Safety  Goal: Patient will be injury free during hospitalization  Outcome: Progressing  Goal: I will remain free of falls  Outcome: Progressing     Problem: Daily Care  Goal: Daily care needs are met  Outcome: Progressing     Problem: Psychosocial Needs  Goal: Demonstrates ability to cope with hospitalization/illness  Outcome: Progressing  Goal: Collaborate with me, my family, and caregiver to identify my specific goals  Outcome: Progressing     Problem: Discharge Barriers  Goal: My discharge needs are met  Outcome: Progressing

## 2024-04-01 LAB
C TRACH RRNA SPEC QL NAA+PROBE: NEGATIVE
ERYTHROCYTE [DISTWIDTH] IN BLOOD BY AUTOMATED COUNT: 20.8 % (ref 11.5–14.5)
ERYTHROCYTE [DISTWIDTH] IN BLOOD BY AUTOMATED COUNT: 21 % (ref 11.5–14.5)
HCT VFR BLD AUTO: 26.6 % (ref 41–52)
HCT VFR BLD AUTO: 28.2 % (ref 41–52)
HGB BLD-MCNC: 10 G/DL (ref 13.5–17.5)
HGB BLD-MCNC: 9.3 G/DL (ref 13.5–17.5)
MCH RBC QN AUTO: 35.5 PG (ref 26–34)
MCH RBC QN AUTO: 35.7 PG (ref 26–34)
MCHC RBC AUTO-ENTMCNC: 35 G/DL (ref 32–36)
MCHC RBC AUTO-ENTMCNC: 35.5 G/DL (ref 32–36)
MCV RBC AUTO: 101 FL (ref 80–100)
MCV RBC AUTO: 102 FL (ref 80–100)
N GONORRHOEA DNA SPEC QL PROBE+SIG AMP: NEGATIVE
NRBC BLD-RTO: 0.5 /100 WBCS (ref 0–0)
NRBC BLD-RTO: 0.6 /100 WBCS (ref 0–0)
PLATELET # BLD AUTO: 100 X10*3/UL (ref 150–450)
PLATELET # BLD AUTO: 122 X10*3/UL (ref 150–450)
RBC # BLD AUTO: 2.62 X10*6/UL (ref 4.5–5.9)
RBC # BLD AUTO: 2.8 X10*6/UL (ref 4.5–5.9)
T VAGINALIS RRNA SPEC QL NAA+PROBE: NEGATIVE
WBC # BLD AUTO: 5 X10*3/UL (ref 4.4–11.3)
WBC # BLD AUTO: 5.6 X10*3/UL (ref 4.4–11.3)

## 2024-04-01 PROCEDURE — 2500000004 HC RX 250 GENERAL PHARMACY W/ HCPCS (ALT 636 FOR OP/ED): Performed by: STUDENT IN AN ORGANIZED HEALTH CARE EDUCATION/TRAINING PROGRAM

## 2024-04-01 PROCEDURE — C9113 INJ PANTOPRAZOLE SODIUM, VIA: HCPCS | Performed by: STUDENT IN AN ORGANIZED HEALTH CARE EDUCATION/TRAINING PROGRAM

## 2024-04-01 PROCEDURE — 36415 COLL VENOUS BLD VENIPUNCTURE: CPT | Performed by: STUDENT IN AN ORGANIZED HEALTH CARE EDUCATION/TRAINING PROGRAM

## 2024-04-01 PROCEDURE — 2500000001 HC RX 250 WO HCPCS SELF ADMINISTERED DRUGS (ALT 637 FOR MEDICARE OP)

## 2024-04-01 PROCEDURE — 99222 1ST HOSP IP/OBS MODERATE 55: CPT | Performed by: STUDENT IN AN ORGANIZED HEALTH CARE EDUCATION/TRAINING PROGRAM

## 2024-04-01 PROCEDURE — 2500000001 HC RX 250 WO HCPCS SELF ADMINISTERED DRUGS (ALT 637 FOR MEDICARE OP): Performed by: STUDENT IN AN ORGANIZED HEALTH CARE EDUCATION/TRAINING PROGRAM

## 2024-04-01 PROCEDURE — 85027 COMPLETE CBC AUTOMATED: CPT | Performed by: STUDENT IN AN ORGANIZED HEALTH CARE EDUCATION/TRAINING PROGRAM

## 2024-04-01 PROCEDURE — 99232 SBSQ HOSP IP/OBS MODERATE 35: CPT

## 2024-04-01 PROCEDURE — 1100000001 HC PRIVATE ROOM DAILY

## 2024-04-01 RX ORDER — POLYETHYLENE GLYCOL 3350, SODIUM CHLORIDE, SODIUM BICARBONATE, POTASSIUM CHLORIDE 420; 11.2; 5.72; 1.48 G/4L; G/4L; G/4L; G/4L
4000 POWDER, FOR SOLUTION ORAL ONCE
Status: COMPLETED | OUTPATIENT
Start: 2024-04-01 | End: 2024-04-01

## 2024-04-01 RX ORDER — POLYETHYLENE GLYCOL 3350, SODIUM CHLORIDE, SODIUM BICARBONATE, POTASSIUM CHLORIDE 420; 11.2; 5.72; 1.48 G/4L; G/4L; G/4L; G/4L
2000 POWDER, FOR SOLUTION ORAL ONCE AS NEEDED
Status: DISCONTINUED | OUTPATIENT
Start: 2024-04-01 | End: 2024-04-03 | Stop reason: HOSPADM

## 2024-04-01 RX ORDER — ONDANSETRON HYDROCHLORIDE 2 MG/ML
4 INJECTION, SOLUTION INTRAVENOUS EVERY 4 HOURS PRN
Status: DISCONTINUED | OUTPATIENT
Start: 2024-04-01 | End: 2024-04-03 | Stop reason: HOSPADM

## 2024-04-01 RX ORDER — POLYETHYLENE GLYCOL 3350 17 G/17G
238 POWDER, FOR SOLUTION ORAL ONCE AS NEEDED
Status: DISCONTINUED | OUTPATIENT
Start: 2024-04-01 | End: 2024-04-03 | Stop reason: HOSPADM

## 2024-04-01 RX ADMIN — PANTOPRAZOLE SODIUM 40 MG: 40 INJECTION, POWDER, FOR SOLUTION INTRAVENOUS at 20:43

## 2024-04-01 RX ADMIN — FOLIC ACID 1 MG: 1 TABLET ORAL at 08:44

## 2024-04-01 RX ADMIN — THIAMINE HCL TAB 100 MG 100 MG: 100 TAB at 08:44

## 2024-04-01 RX ADMIN — PANTOPRAZOLE SODIUM 40 MG: 40 INJECTION, POWDER, FOR SOLUTION INTRAVENOUS at 08:44

## 2024-04-01 RX ADMIN — POLYETHYLENE GLYCOL 3350, SODIUM SULFATE ANHYDROUS, SODIUM BICARBONATE, SODIUM CHLORIDE, POTASSIUM CHLORIDE 4000 ML: 236; 22.74; 6.74; 5.86; 2.97 POWDER, FOR SOLUTION ORAL at 17:45

## 2024-04-01 RX ADMIN — LOSARTAN POTASSIUM: 50 TABLET, FILM COATED ORAL at 08:44

## 2024-04-01 RX ADMIN — Medication 1 TABLET: at 08:44

## 2024-04-01 ASSESSMENT — LIFESTYLE VARIABLES
NAUSEA AND VOMITING: NO NAUSEA AND NO VOMITING
ORIENTATION AND CLOUDING OF SENSORIUM: ORIENTED AND CAN DO SERIAL ADDITIONS
HEADACHE, FULLNESS IN HEAD: NOT PRESENT
ANXIETY: NO ANXIETY, AT EASE
AUDITORY DISTURBANCES: NOT PRESENT
TOTAL SCORE: 0
NAUSEA AND VOMITING: NO NAUSEA AND NO VOMITING
TOTAL SCORE: 0
TREMOR: NO TREMOR
TOTAL SCORE: 0
HEADACHE, FULLNESS IN HEAD: NOT PRESENT
HEADACHE, FULLNESS IN HEAD: NOT PRESENT
ANXIETY: NO ANXIETY, AT EASE
AUDITORY DISTURBANCES: NOT PRESENT
ORIENTATION AND CLOUDING OF SENSORIUM: ORIENTED AND CAN DO SERIAL ADDITIONS
PULSE: 80
TREMOR: NO TREMOR
BLOOD PRESSURE: 161/102
AUDITORY DISTURBANCES: NOT PRESENT
AUDITORY DISTURBANCES: NOT PRESENT
HEADACHE, FULLNESS IN HEAD: NOT PRESENT
VISUAL DISTURBANCES: NOT PRESENT
VISUAL DISTURBANCES: NOT PRESENT
AUDITORY DISTURBANCES: NOT PRESENT
AGITATION: NORMAL ACTIVITY
ANXIETY: NO ANXIETY, AT EASE
TREMOR: NO TREMOR
PAROXYSMAL SWEATS: NO SWEAT VISIBLE
AGITATION: NORMAL ACTIVITY
PAROXYSMAL SWEATS: NO SWEAT VISIBLE
PAROXYSMAL SWEATS: NO SWEAT VISIBLE
ORIENTATION AND CLOUDING OF SENSORIUM: ORIENTED AND CAN DO SERIAL ADDITIONS
ORIENTATION AND CLOUDING OF SENSORIUM: ORIENTED AND CAN DO SERIAL ADDITIONS
AGITATION: NORMAL ACTIVITY
TOTAL SCORE: 0
PAROXYSMAL SWEATS: NO SWEAT VISIBLE
BLOOD PRESSURE: 156/94
NAUSEA AND VOMITING: NO NAUSEA AND NO VOMITING
HEADACHE, FULLNESS IN HEAD: NOT PRESENT
ORIENTATION AND CLOUDING OF SENSORIUM: ORIENTED AND CAN DO SERIAL ADDITIONS
AGITATION: NORMAL ACTIVITY
PAROXYSMAL SWEATS: NO SWEAT VISIBLE
VISUAL DISTURBANCES: NOT PRESENT
VISUAL DISTURBANCES: NOT PRESENT
AGITATION: NORMAL ACTIVITY
VISUAL DISTURBANCES: NOT PRESENT
TOTAL SCORE: 0
TREMOR: NO TREMOR
ANXIETY: NO ANXIETY, AT EASE
NAUSEA AND VOMITING: NO NAUSEA AND NO VOMITING
NAUSEA AND VOMITING: NO NAUSEA AND NO VOMITING
ANXIETY: NO ANXIETY, AT EASE
PULSE: 83
TREMOR: NO TREMOR

## 2024-04-01 ASSESSMENT — PAIN SCALES - WONG BAKER: WONGBAKER_NUMERICALRESPONSE: NO HURT

## 2024-04-01 ASSESSMENT — COGNITIVE AND FUNCTIONAL STATUS - GENERAL
MOBILITY SCORE: 24
DAILY ACTIVITIY SCORE: 24
DAILY ACTIVITIY SCORE: 24

## 2024-04-01 ASSESSMENT — PAIN SCALES - GENERAL
PAINLEVEL_OUTOF10: 0 - NO PAIN
PAINLEVEL_OUTOF10: 0 - NO PAIN

## 2024-04-01 NOTE — PROGRESS NOTES
"Gastroenterology Consult Service Progress Note  Department of Gastroenterology & Hepatology  [facility]    [facility]  Date of Service  April 1, 2024   Patient: Aguilar Wills    Medical Record: [id number]    Interval History:   No acute issues overnight. Noted two brown Bms yesterday.    Vital Signs:  Vitals:    04/01/24 0446 04/01/24 0800 04/01/24 1445 04/01/24 1643   BP: (!) 166/96 (!) 161/102 (!) 173/94 142/83   BP Location:    Left arm   Patient Position:    Lying   Pulse: 71 83 80 82   Resp:   18 16   Temp:   36.5 °C (97.7 °F) 36 °C (96.8 °F)   TempSrc:   Temporal Temporal   SpO2: 99%  99% 100%   Weight:       Height:           Physical Exam:  General appearance: well appearing, no acute distress  Skin: no jaundice  Head: normal  Eyes: anicteric sclera  Lungs: lungs clear to auscultation, no wheezing or rhonchi  Heart: RRR without murmur, gallop, or rubs.  No ectopy  Abdomen: Normal abdominal exam, Abdomen soft, non-tender. Bowel sounds normal. No masses, organomegaly  Extremities: Extremities normal. No lower extremity edema.  Neuro: AOx3.    Diagnostic Testing:  Labs:  Lab Results   Component Value Date    WBC 5.0 04/01/2024    HGB 9.3 (L) 04/01/2024    HCT 26.6 (L) 04/01/2024     (H) 04/01/2024     (L) 04/01/2024     Lab Results   Component Value Date    GLUCOSE 82 03/31/2024    CALCIUM 8.8 03/31/2024     03/31/2024    K 3.5 03/31/2024    CO2 25 03/31/2024     03/31/2024    BUN 4 (L) 03/31/2024    CREATININE 0.61 03/31/2024     Lab Results   Component Value Date    ALT 7 (L) 03/30/2024    AST 33 03/30/2024    ALKPHOS 79 03/30/2024    BILITOT 1.1 03/30/2024     No results found for: \"IRON\", \"TIBC\", \"FERRITIN\"    Reviewed:  Recent pertinent imaging/procedures    Assessment:     Aguilar Wills is a 48 y.o. male with a PMH of HTN, alcohol use disorder (daily fifth of liquor, last drink 3/28/24), alcohol-induced pancreatitis, bipolar " depression s/p 5 previous suicide attempts (last 2 weeks prior w/ 1 bottle of aspirin) who presented to [facility] 3/30/24 due to 1 day of painless bloody diarrhea.      Gastroenterology is consulted for painless bloody diarrhea. Consideration for infectious diarrhea, diverticulosis, hemorrhoids, colonic ulcer, versus malignancy based on clinical history and imaging. Additional considerations of prior UGIB in setting of midepigastric abdominal pain, NSAID usage, and melena. Acuity of Hgb drop unclear but low concern for active GIB in setting of >12 hours w/o BM and stable Hgb. No prior endoscopic evaluation. Significant alcohol usage with thrombocytopenia but imaging not consistent with decompensated cirrhosis.     Plan:        - plan for EGD/colonoscopy ideally with anesthesia in setting of significant alcohol abuse Tuesday 4/2/24, clear liquid diet and NPO at midnight pre-procedure, Golytely 4 L 4/1/24 PM  - monitor CBC, Bms closely; transfuse per primary team  - check stool enteric panel, Cdiff for potential infectious diarrhea  - monitor for alcohol withdrawal  - defer supportive care per primary team    Patient seen and staffed with Dr. Carlos Amaya.  Thank you for the consultation. Gastroenterology will continue to the follow the patient.   Please do not hesitate to contact me or page [phone] if there are any further questions between the weekday hours of 7 AM - 5 PM.   If there is an urgent concern during the weekend, after-hours, or holidays; then please page the [place] GI [place] [phone]. Thank you.    SIGNATURE: Kimmy Fernandez MD PATIENT NAME: Aguilar Wills   DATE: April 1, 2024 MRN: [id number]

## 2024-04-01 NOTE — CARE PLAN
The patient's goals for the shift include n/a    The clinical goals for the shift include Patient will maintain hemoglobin above 7 during shift    Patient hemoglobin stable. Patient on clear liquid diet. Bowel Prep started. Patient will be NPO at midnight.           Problem: Pain  Goal: My pain/discomfort is manageable  Outcome: Progressing     Problem: Safety  Goal: Patient will be injury free during hospitalization  Outcome: Progressing  Goal: I will remain free of falls  Outcome: Progressing     Problem: Daily Care  Goal: Daily care needs are met  Outcome: Progressing     Problem: Psychosocial Needs  Goal: Demonstrates ability to cope with hospitalization/illness  Outcome: Progressing  Goal: Collaborate with me, my family, and caregiver to identify my specific goals  Outcome: Progressing     Problem: Discharge Barriers  Goal: My discharge needs are met  4/1/2024 1748 by Quynh Linares RN  Outcome: Progressing  4/1/2024 1002 by Quynh Linares RN  Flowsheets (Taken 4/1/2024 1002)  Resident's discharge needs are met: Identify potential discharge barriers on admission and throughout stay

## 2024-04-01 NOTE — CARE PLAN
The patient's goals for the shift include n/a    The clinical goals for the shift include Patient will have no bloody stool during this shift

## 2024-04-01 NOTE — CONSULTS
Gastroenterology Consult Service  Department of Gastroenterology & Hepatology  [facility] [place]    [facility]  Date of Service  March 31, 2024   Patient: Aguilar Wills    Medical Record: [id number]    History of Present Illness:  Aguilar Wills is a 48 y.o. male with a PMH of HTN, alcohol use disorder (daily fifth of liquor, last drink 3/28/24), alcohol-induced pancreatitis, bipolar depression s/p 5 previous suicide attempts (last 2 weeks prior w/ 1 bottle of aspirin) who presented to [facility] 3/30/24 due to 1 day of painless bloody diarrhea.     Gastroenterology consulted for evaluation of painless bloody diarrhea. Patient endorsed symptoms started 3/29/24 evening with 3-4 bloody diarrheal episodes/day without abdominal pain, nausea, or vomiting. Patient notes baseline diarrhea without bloody 1-2x/day with urgency. Denied prior history of EGD or colonoscopy. Denied history of heartburn or significant NSAID usage other than recent aspirin related suicide attempt. However, patient notes prior history of intermittent midepigastric pain and black tarry stools that has since resolved. Labs on presentation notable for Hgb 10.7 (b/l Hgb 14 2/3/24) / Plt 82 / INR 1.0 / BUN 8 / Cr 0.59 / Alcohol 275. Repeat Hgb 8.8-8.9. Last bloody diarrheal episode 3/30/24 PM. CT AP noted mild bowel wall thickening involving the rectum and sigmoid colon, hepatic steatosis.    Past Medical History:    Past Medical History:   Diagnosis Date    Hypertension     Pancreatitis        Past Surgical History:  No past surgical history on file.    Family History:  No family history on file.    Social History:  Social History     Tobacco Use    Smoking status: Every Day     Packs/day: 1     Types: Cigarettes    Smokeless tobacco: Never   Substance Use Topics    Alcohol use: Not on file       Allergies:  Allergies   Allergen Reactions    Penicillins Hives and Unknown       Medications:  Prior to Admission  Medications:  Medications Prior to Admission   Medication Sig Dispense Refill Last Dose    hydrOXYzine HCL (Atarax) 25 mg tablet Take 2 tablets (50 mg) by mouth as needed at bedtime (insomnia). 30 tablet 0          Current Facility-Administered Medications:     diazePAM (Valium) injection 10 mg, 10 mg, intravenous, q2h PRN, Annika Baker DO    folic acid (Folvite) tablet 1 mg, 1 mg, oral, Daily, Annika Baker DO, 1 mg at 03/31/24 0920    hydrOXYzine HCL (Atarax) tablet 50 mg, 50 mg, oral, Nightly PRN, Annika Baker DO    losartan 50 mg, hydroCHLOROthiazide 12.5 mg for Hyzaar 50/12.5, , oral, Daily, Nettie Goodrich MD, Given at 03/31/24 0919    multivitamin with minerals 1 tablet, 1 tablet, oral, Daily, Annika Baker DO, 1 tablet at 03/31/24 0921    pantoprazole (ProtoNix) injection 40 mg, 40 mg, intravenous, BID, Glen Baker MD, 40 mg at 03/31/24 0921    thiamine (Vitamin B-1) tablet 100 mg, 100 mg, oral, Daily, Annika Baker DO, 100 mg at 03/31/24 0921    Pertinent Review of Systems:    Review of Systems   Gastrointestinal:  Positive for blood in stool and diarrhea. Negative for abdominal pain, nausea and vomiting.     Vital Signs:  Vitals:    03/31/24 0525 03/31/24 0900 03/31/24 1300 03/31/24 1538   BP: 165/89 (!) 182/116 (!) 176/104 154/89   BP Location:    Left arm   Patient Position:  Lying Lying    Pulse: 60 71 72 77   Resp: 17 18 18 18   Temp: 36.1 °C (97 °F) 36.6 °C (97.9 °F) 36.6 °C (97.9 °F) 36.6 °C (97.9 °F)   TempSrc: Temporal   Temporal   SpO2: 99% 100% 100% 100%   Weight:       Height:           Physical Exam:  General appearance: well appearing, no acute distress  Skin: no jaundice  Head: normal  Eyes: anicteric sclera  Lungs: lungs clear to auscultation, no wheezing or rhonchi  Heart: RRR without murmur, gallop, or rubs.  No ectopy  Abdomen: Normal abdominal exam, Abdomen soft, non-tender. Bowel sounds normal. No masses, organomegaly  Extremities: Extremities normal. No lower extremity  "edema.  Neuro: AOx3.    Diagnostic Testing:  Labs:  Lab Results   Component Value Date    WBC 4.2 (L) 03/31/2024    HGB 8.9 (L) 03/31/2024    HCT 24.5 (L) 03/31/2024    MCV 98 03/31/2024    PLT 71 (L) 03/31/2024     Lab Results   Component Value Date    GLUCOSE 82 03/31/2024    CALCIUM 8.8 03/31/2024     03/31/2024    K 3.5 03/31/2024    CO2 25 03/31/2024     03/31/2024    BUN 4 (L) 03/31/2024    CREATININE 0.61 03/31/2024     Lab Results   Component Value Date    ALT 7 (L) 03/30/2024    AST 33 03/30/2024    ALKPHOS 79 03/30/2024    BILITOT 1.1 03/30/2024     No results found for: \"IRON\", \"TIBC\", \"FERRITIN\"    Reviewed:  Recent pertinent imaging/procedures    Assessment:     Aguilar Wills is a 48 y.o. male with a PMH of HTN, alcohol use disorder (daily fifth of liquor, last drink 3/28/24), alcohol-induced pancreatitis, bipolar depression s/p 5 previous suicide attempts (last 2 weeks prior w/ 1 bottle of aspirin) who presented to [facility] 3/30/24 due to 1 day of painless bloody diarrhea.     Gastroenterology is consulted for painless bloody diarrhea. Consideration for infectious diarrhea, diverticulosis, hemorrhoids, colonic ulcer, versus malignancy based on clinical history and imaging. Additional considerations of prior UGIB in setting of midepigastric abdominal pain, NSAID usage, and melena. Acuity of Hgb drop unclear but low concern for active GIB in setting of >12 hours w/o BM and stable Hgb. No prior endoscopic evaluation. Significant alcohol usage with thrombocytopenia but imaging not consistent with decompensated cirrhosis.    Plan:        - tentative plan for colonoscopy +/- EGD Tuesday 4/2/24, clear liquid diet and NPO at midnight pre-procedure, Golytely 4 L 4/1/24 PM  - monitor CBC, Bms closely; transfuse per primary team  - check stool enteric panel, Cdiff for potential infectious diarrhea  - monitor for alcohol withdrawal  - defer supportive care per primary team    Patient staffed with Dr. Gonzalez " Monique.  Thank you for the consultation. Gastroenterology will continue to the follow the patient.   Please do not hesitate to contact me or page [phone] if there are any further questions between the weekday hours of 7 AM - 5 PM.   If there is an urgent concern during the weekend, after-hours, or holidays; then please page the [place] [phone]. Thank you.    SIGNATURE: Kimmy Fernandez MD PATIENT NAME: Aguilar Wills   DATE: March 31, 2024 MRN: [id number]

## 2024-04-01 NOTE — PROGRESS NOTES
48 y.o. male admitted for Lower GI bleed [K92.2]. Today is Hospital Day 2.    Subjective   No ONE.  Pt slept well, no new concerns.   Pt understanding of plan for EGD/colonoscopy tomorrow.  [facility] offered to pt on discharge; pt turned it down, sharing that he has tried inpatient rehab in the past which has not worked and that he wants to quit drinking alcohol on his own.     Objective     Scheduled Medications:   folic acid, 1 mg, oral, Daily  losartan 50 mg, hydroCHLOROthiazide 12.5 mg for Hyzaar 50/12.5, , oral, Daily  multivitamin with minerals, 1 tablet, oral, Daily  pantoprazole, 40 mg, intravenous, BID  polyethylene glycol-electrolytes, 4,000 mL, oral, Once  thiamine, 100 mg, oral, Daily       Continuous Medications:         PRN Medications:   PRN medications: diazePAM, hydrOXYzine HCL, ondansetron, polyethylene glycol, polyethylene glycol-electrolytes    Dietary Orders (From admission, onward)       Start     Ordered    04/02/24 0001  NPO Diet; Effective midnight  Diet effective midnight         04/01/24 1725    04/01/24 0001  Adult diet Clear Liquid  Diet effective midnight        Comments: No red foods/liquids   Question:  Diet type  Answer:  Clear Liquid    03/31/24 1240                  Vitals:  Most Recent:  Vitals:    04/01/24 1643   BP: 142/83   Pulse: 82   Resp: 16   Temp: 36 °C (96.8 °F)   SpO2: 100%       24hr Min/Max:  Temp  Min: 36 °C (96.8 °F)  Max: 36.5 °C (97.7 °F)  Pulse  Min: 71  Max: 83  BP  Min: 142/83  Max: 173/94  Resp  Min: 16  Max: 18  SpO2  Min: 99 %  Max: 100 %    Intake/Output x24h:    Intake/Output Summary (Last 24 hours) at 4/1/2024 1735  Last data filed at 4/1/2024 0200  Gross per 24 hour   Intake --   Output 500 ml   Net -500 ml          Physical Exam:  Physical Exam  Vitals reviewed.   Constitutional:       General: He is not in acute distress.     Appearance: Normal appearance. He is not ill-appearing.   HENT:      Head: Normocephalic and atraumatic.      Nose: Nose normal.    Eyes:      Extraocular Movements: Extraocular movements intact.      Conjunctiva/sclera: Conjunctivae normal.   Cardiovascular:      Rate and Rhythm: Normal rate.   Pulmonary:      Effort: Pulmonary effort is normal. No respiratory distress.   Abdominal:      General: Abdomen is flat. There is no distension.      Palpations: Abdomen is soft.      Tenderness: There is no abdominal tenderness. There is no guarding or rebound.   Musculoskeletal:         General: No swelling. Normal range of motion.      Cervical back: Normal range of motion.   Skin:     Capillary Refill: Capillary refill takes less than 2 seconds.   Neurological:      General: No focal deficit present.      Mental Status: He is alert and oriented to person, place, and time. Mental status is at baseline.   Psychiatric:         Mood and Affect: Mood normal.         Behavior: Behavior normal.         Relevant Results  Results for orders placed or performed during the hospital encounter of 03/30/24 (from the past 24 hour(s))   CBC   Result Value Ref Range    WBC 4.5 4.4 - 11.3 x10*3/uL    nRBC 0.9 (H) 0.0 - 0.0 /100 WBCs    RBC 2.56 (L) 4.50 - 5.90 x10*6/uL    Hemoglobin 9.3 (L) 13.5 - 17.5 g/dL    Hematocrit 25.3 (L) 41.0 - 52.0 %    MCV 99 80 - 100 fL    MCH 36.3 (H) 26.0 - 34.0 pg    MCHC 36.8 (H) 32.0 - 36.0 g/dL    RDW 20.4 (H) 11.5 - 14.5 %    Platelets 94 (L) 150 - 450 x10*3/uL   CBC   Result Value Ref Range    WBC 5.0 4.4 - 11.3 x10*3/uL    nRBC 0.6 (H) 0.0 - 0.0 /100 WBCs    RBC 2.62 (L) 4.50 - 5.90 x10*6/uL    Hemoglobin 9.3 (L) 13.5 - 17.5 g/dL    Hematocrit 26.6 (L) 41.0 - 52.0 %     (H) 80 - 100 fL    MCH 35.5 (H) 26.0 - 34.0 pg    MCHC 35.0 32.0 - 36.0 g/dL    RDW 21.0 (H) 11.5 - 14.5 %    Platelets 100 (L) 150 - 450 x10*3/uL          Assessment/Plan   Principal Problem:    Lower GI bleed  Active Problems:    Alcohol use disorder    Bipolar depression (CMS/HCC)    48-year-old male with history of HTN, EtOH abuse c/b gastritis and  "depression, presents for chief complaint of blood in stool (bright red and \"dark spots\"), concerning for possible lower GI bleed. Baseline Hgb 12-14, was 10.7 on admission and has downtrended again to 8.8. ENMANUEL in ED with BRBPR and no obvious hemorrhoids. Markedly hypertensive this admission, but asymptomatic, continuing home BP meds. CIWA protocol in effect. CT abd/pel with mild bowel wall thickening (rectum and sigmoid), consistent with mild colitis. Patient HDS and appropriate for regular nursing floor. Detailed plan below:     #GI bleed  - possibly mixed upper/lower, as patient reports both bright red stool and \"black spots,\" although predominantly bright red; chronic EtOH abuse increases risk of PUD or variceal bleeding, although no established history of cirrhosis and no hematemesis  - patient can't remember ever having had a colonoscopy or EGD before  - baseline Hgb 12-14 -> 10.7 -> 8.8 // trend CBC BID  - T&S active until 4/2 @ 9am, transfuse for Hg<7  - pantoprazole 40mg IV BID  - also with new significant thrombocytopenia without other indicators for acute hepatic failure, will trend  - pt on CLD, NPO at midnight for EGD/colonoscopy tomorrow 4/2      #AUD  - continue to monitor RFP, replete electrolytes as needed.  - started on folic acid, MV, and thiamine  - [facility] protocol  - SW consulted     #bipolar depression   #previous suicide attempts  - hydroxyzine 50mg prn anxiety  - no current SI or HI, no indication for inpatient psychiatry consultation    # HTN  - continue home losartain 50mg and hydrochlorothiazide 12.5mg    Code Status: Full Code   Emergency Contact: Extended Emergency Contact Information  Primary Emergency Contact: Rosa Wills  Home Phone: [phone]  Relation: Parent  PCP: No Assigned PCP Generic Provider, MD Ronan Sparks, DO  PGY-1 Family Medicine  "

## 2024-04-02 LAB
ABO GROUP (TYPE) IN BLOOD: NORMAL
ANION GAP SERPL CALC-SCNC: 14 MMOL/L (ref 10–20)
ANTIBODY SCREEN: NORMAL
BUN SERPL-MCNC: 5 MG/DL (ref 6–23)
CALCIUM SERPL-MCNC: 9.3 MG/DL (ref 8.6–10.6)
CHLORIDE SERPL-SCNC: 97 MMOL/L (ref 98–107)
CO2 SERPL-SCNC: 29 MMOL/L (ref 21–32)
CREAT SERPL-MCNC: 0.63 MG/DL (ref 0.5–1.3)
EGFRCR SERPLBLD CKD-EPI 2021: >90 ML/MIN/1.73M*2
ERYTHROCYTE [DISTWIDTH] IN BLOOD BY AUTOMATED COUNT: 21.2 % (ref 11.5–14.5)
GLUCOSE SERPL-MCNC: 90 MG/DL (ref 74–99)
HCT VFR BLD AUTO: 29.5 % (ref 41–52)
HGB BLD-MCNC: 10.1 G/DL (ref 13.5–17.5)
MCH RBC QN AUTO: 35.7 PG (ref 26–34)
MCHC RBC AUTO-ENTMCNC: 34.2 G/DL (ref 32–36)
MCV RBC AUTO: 104 FL (ref 80–100)
NRBC BLD-RTO: 0.5 /100 WBCS (ref 0–0)
PLATELET # BLD AUTO: 129 X10*3/UL (ref 150–450)
POTASSIUM SERPL-SCNC: 3.6 MMOL/L (ref 3.5–5.3)
RBC # BLD AUTO: 2.83 X10*6/UL (ref 4.5–5.9)
RH FACTOR (ANTIGEN D): NORMAL
SODIUM SERPL-SCNC: 136 MMOL/L (ref 136–145)
WBC # BLD AUTO: 4.3 X10*3/UL (ref 4.4–11.3)

## 2024-04-02 PROCEDURE — 85027 COMPLETE CBC AUTOMATED: CPT

## 2024-04-02 PROCEDURE — 86900 BLOOD TYPING SEROLOGIC ABO: CPT

## 2024-04-02 PROCEDURE — 2500000004 HC RX 250 GENERAL PHARMACY W/ HCPCS (ALT 636 FOR OP/ED): Performed by: STUDENT IN AN ORGANIZED HEALTH CARE EDUCATION/TRAINING PROGRAM

## 2024-04-02 PROCEDURE — 2500000001 HC RX 250 WO HCPCS SELF ADMINISTERED DRUGS (ALT 637 FOR MEDICARE OP)

## 2024-04-02 PROCEDURE — 99232 SBSQ HOSP IP/OBS MODERATE 35: CPT | Performed by: STUDENT IN AN ORGANIZED HEALTH CARE EDUCATION/TRAINING PROGRAM

## 2024-04-02 PROCEDURE — 36415 COLL VENOUS BLD VENIPUNCTURE: CPT

## 2024-04-02 PROCEDURE — 1100000001 HC PRIVATE ROOM DAILY

## 2024-04-02 PROCEDURE — 99232 SBSQ HOSP IP/OBS MODERATE 35: CPT

## 2024-04-02 PROCEDURE — C9113 INJ PANTOPRAZOLE SODIUM, VIA: HCPCS | Performed by: STUDENT IN AN ORGANIZED HEALTH CARE EDUCATION/TRAINING PROGRAM

## 2024-04-02 PROCEDURE — 80048 BASIC METABOLIC PNL TOTAL CA: CPT

## 2024-04-02 RX ADMIN — LOSARTAN POTASSIUM: 50 TABLET, FILM COATED ORAL at 09:03

## 2024-04-02 RX ADMIN — PANTOPRAZOLE SODIUM 40 MG: 40 INJECTION, POWDER, FOR SOLUTION INTRAVENOUS at 09:04

## 2024-04-02 RX ADMIN — Medication 1 TABLET: at 09:03

## 2024-04-02 RX ADMIN — THIAMINE HCL TAB 100 MG 100 MG: 100 TAB at 09:04

## 2024-04-02 RX ADMIN — FOLIC ACID 1 MG: 1 TABLET ORAL at 09:04

## 2024-04-02 RX ADMIN — PANTOPRAZOLE SODIUM 40 MG: 40 INJECTION, POWDER, FOR SOLUTION INTRAVENOUS at 20:48

## 2024-04-02 ASSESSMENT — LIFESTYLE VARIABLES
ANXIETY: NO ANXIETY, AT EASE
ANXIETY: NO ANXIETY, AT EASE
BLOOD PRESSURE: 141/81
PULSE: 74
AUDITORY DISTURBANCES: NOT PRESENT
AUDITORY DISTURBANCES: NOT PRESENT
AGITATION: NORMAL ACTIVITY
TREMOR: NO TREMOR
HEADACHE, FULLNESS IN HEAD: NOT PRESENT
AGITATION: NORMAL ACTIVITY
NAUSEA AND VOMITING: NO NAUSEA AND NO VOMITING
VISUAL DISTURBANCES: NOT PRESENT
NAUSEA AND VOMITING: NO NAUSEA AND NO VOMITING
VISUAL DISTURBANCES: NOT PRESENT
PAROXYSMAL SWEATS: NO SWEAT VISIBLE
TOTAL SCORE: 0
TREMOR: NO TREMOR
HEADACHE, FULLNESS IN HEAD: NOT PRESENT
PAROXYSMAL SWEATS: NO SWEAT VISIBLE
TOTAL SCORE: 0
ORIENTATION AND CLOUDING OF SENSORIUM: ORIENTED AND CAN DO SERIAL ADDITIONS
ORIENTATION AND CLOUDING OF SENSORIUM: ORIENTED AND CAN DO SERIAL ADDITIONS

## 2024-04-02 ASSESSMENT — PAIN SCALES - GENERAL: PAINLEVEL_OUTOF10: 0 - NO PAIN

## 2024-04-02 ASSESSMENT — ACTIVITIES OF DAILY LIVING (ADL): LACK_OF_TRANSPORTATION: NO

## 2024-04-02 ASSESSMENT — PAIN SCALES - WONG BAKER: WONGBAKER_NUMERICALRESPONSE: NO HURT

## 2024-04-02 NOTE — PROGRESS NOTES
"Gastroenterology Consult Service Progress Note  Department of Gastroenterology & Hepatology  [facility] [place]    [facility]  Date of Service  April 2, 2024   Patient: Aguilar Wills    Medical Record: [id number]    Interval History:   Plan for EGD/colonoscopy today. Completed colonoscopy prep.    Vital Signs:  Vitals:    04/02/24 0400 04/02/24 0452 04/02/24 0900 04/02/24 1342   BP: 141/81  143/84 152/79   Pulse: 74  73 66   Resp:  16  18   Temp:       TempSrc:       SpO2:  100%  99%   Weight:       Height:           Physical Exam:  General appearance: well appearing, no acute distress  Skin: no jaundice  Head: normal  Eyes: anicteric sclera  Lungs: lungs clear to auscultation, no wheezing or rhonchi  Heart: RRR without murmur, gallop, or rubs.  No ectopy  Abdomen: Normal abdominal exam, Abdomen soft, non-tender. Bowel sounds normal. No masses, organomegaly  Extremities: Extremities normal. No lower extremity edema.  Neuro: AOx3.    Diagnostic Testing:  Labs:  Lab Results   Component Value Date    WBC 4.3 (L) 04/02/2024    HGB 10.1 (L) 04/02/2024    HCT 29.5 (L) 04/02/2024     (H) 04/02/2024     (L) 04/02/2024     Lab Results   Component Value Date    GLUCOSE 90 04/02/2024    CALCIUM 9.3 04/02/2024     04/02/2024    K 3.6 04/02/2024    CO2 29 04/02/2024    CL 97 (L) 04/02/2024    BUN 5 (L) 04/02/2024    CREATININE 0.63 04/02/2024     Lab Results   Component Value Date    ALT 7 (L) 03/30/2024    AST 33 03/30/2024    ALKPHOS 79 03/30/2024    BILITOT 1.1 03/30/2024     No results found for: \"IRON\", \"TIBC\", \"FERRITIN\"    Reviewed:  Recent pertinent imaging/procedures    Assessment:     Aguilar Wills is a 48 y.o. male with a PMH of HTN, alcohol use disorder (daily fifth of liquor, last drink 3/28/24), alcohol-induced pancreatitis, bipolar depression s/p 5 previous suicide attempts (last 2 weeks prior w/ 1 bottle of aspirin) who presented to [facility] 3/30/24 due to " 1 day of painless bloody diarrhea.      Gastroenterology is consulted for painless bloody diarrhea. Consideration for infectious diarrhea, diverticulosis, hemorrhoids, colonic ulcer, versus malignancy based on clinical history and imaging. Additional considerations of prior UGIB in setting of midepigastric abdominal pain, NSAID usage, and melena. Acuity of Hgb drop unclear but low concern for active GIB in setting of >12 hours w/o BM and stable Hgb. No prior endoscopic evaluation. Significant alcohol usage with thrombocytopenia but imaging not consistent with decompensated cirrhosis.    Updates 4/2/24:  - scheduled for EGD/colonoscopy today pending anesthesia availability     Plan:        - plan for EGD/colonoscopy anesthesia in setting of significant alcohol abuse Tuesday 4/2/24; if unable to complete then reschedule for tomorrow Wednesday 4/3/24  - monitor CBC, Bms closely; transfuse per primary team  - check stool enteric panel, Cdiff for potential infectious diarrhea  - monitor for alcohol withdrawal  - defer supportive care per primary team    Patient staffed with Dr. Carlos Amaya.  Thank you for the consultation. Gastroenterology will continue to the follow the patient.   Please do not hesitate to contact me or page [phone] if there are any further questions between the weekday hours of 7 AM - 5 PM.   If there is an urgent concern during the weekend, after-hours, or holidays; then please page the [place] [phone]. Thank you.    SIGNATURE: Kimmy Fernandez MD PATIENT NAME: Aguilar Wills   DATE: April 2, 2024 MRN: [id number]

## 2024-04-02 NOTE — PROGRESS NOTES
48 y.o. male admitted for Lower GI bleed [K92.2]. Today is Hospital Day 3.    Subjective   No ONE.  Pt completed bowel prep and reports clear stools. Pt endorses scant blood on toilet paper overnight.  Pt shared that he has had a series of deaths within his family/friend group over the past two years which has caused a lot of personal distress; the most recent being the mother of children two weeks ago.     Objective     Scheduled Medications:   folic acid, 1 mg, oral, Daily  losartan 50 mg, hydroCHLOROthiazide 12.5 mg for Hyzaar 50/12.5, , oral, Daily  multivitamin with minerals, 1 tablet, oral, Daily  pantoprazole, 40 mg, intravenous, BID  thiamine, 100 mg, oral, Daily       Continuous Medications:         PRN Medications:   PRN medications: diazePAM, hydrOXYzine HCL, ondansetron, polyethylene glycol, polyethylene glycol-electrolytes    Dietary Orders (From admission, onward)       Start     Ordered    04/02/24 0001  NPO Diet; Effective midnight  Diet effective midnight         04/01/24 1725                  Vitals:  Most Recent:  Vitals:    04/02/24 0900   BP: 143/84   Pulse: 73   Resp:    Temp:    SpO2:        24hr Min/Max:  Temp  Min: 36 °C (96.8 °F)  Max: 36.5 °C (97.7 °F)  Pulse  Min: 66  Max: 82  BP  Min: 141/81  Max: 173/94  Resp  Min: 16  Max: 18  SpO2  Min: 99 %  Max: 100 %    Intake/Output x24h:    Intake/Output Summary (Last 24 hours) at 4/2/2024 1251  Last data filed at 4/1/2024 2045  Gross per 24 hour   Intake --   Output 300 ml   Net -300 ml          Physical Exam:  Physical Exam  Vitals reviewed.   Constitutional:       General: He is not in acute distress.     Appearance: Normal appearance. He is not ill-appearing.   HENT:      Head: Normocephalic and atraumatic.      Nose: Nose normal.   Eyes:      Extraocular Movements: Extraocular movements intact.      Conjunctiva/sclera: Conjunctivae normal.   Cardiovascular:      Rate and Rhythm: Normal rate.   Pulmonary:      Effort: Pulmonary effort is  normal. No respiratory distress.   Abdominal:      General: Abdomen is flat. There is no distension.      Palpations: Abdomen is soft.      Tenderness: There is no abdominal tenderness. There is no guarding or rebound.   Musculoskeletal:         General: No swelling. Normal range of motion.      Cervical back: Normal range of motion.   Skin:     Capillary Refill: Capillary refill takes less than 2 seconds.   Neurological:      General: No focal deficit present.      Mental Status: He is alert and oriented to person, place, and time. Mental status is at baseline.   Psychiatric:         Mood and Affect: Mood normal.         Behavior: Behavior normal.       Relevant Results  Results for orders placed or performed during the hospital encounter of 03/30/24 (from the past 24 hour(s))   CBC   Result Value Ref Range    WBC 5.0 4.4 - 11.3 x10*3/uL    nRBC 0.6 (H) 0.0 - 0.0 /100 WBCs    RBC 2.62 (L) 4.50 - 5.90 x10*6/uL    Hemoglobin 9.3 (L) 13.5 - 17.5 g/dL    Hematocrit 26.6 (L) 41.0 - 52.0 %     (H) 80 - 100 fL    MCH 35.5 (H) 26.0 - 34.0 pg    MCHC 35.0 32.0 - 36.0 g/dL    RDW 21.0 (H) 11.5 - 14.5 %    Platelets 100 (L) 150 - 450 x10*3/uL   CBC   Result Value Ref Range    WBC 5.6 4.4 - 11.3 x10*3/uL    nRBC 0.5 (H) 0.0 - 0.0 /100 WBCs    RBC 2.80 (L) 4.50 - 5.90 x10*6/uL    Hemoglobin 10.0 (L) 13.5 - 17.5 g/dL    Hematocrit 28.2 (L) 41.0 - 52.0 %     (H) 80 - 100 fL    MCH 35.7 (H) 26.0 - 34.0 pg    MCHC 35.5 32.0 - 36.0 g/dL    RDW 20.8 (H) 11.5 - 14.5 %    Platelets 122 (L) 150 - 450 x10*3/uL   CBC   Result Value Ref Range    WBC 4.3 (L) 4.4 - 11.3 x10*3/uL    nRBC 0.5 (H) 0.0 - 0.0 /100 WBCs    RBC 2.83 (L) 4.50 - 5.90 x10*6/uL    Hemoglobin 10.1 (L) 13.5 - 17.5 g/dL    Hematocrit 29.5 (L) 41.0 - 52.0 %     (H) 80 - 100 fL    MCH 35.7 (H) 26.0 - 34.0 pg    MCHC 34.2 32.0 - 36.0 g/dL    RDW 21.2 (H) 11.5 - 14.5 %    Platelets 129 (L) 150 - 450 x10*3/uL   Basic metabolic panel   Result Value Ref  "Range    Glucose 90 74 - 99 mg/dL    Sodium 136 136 - 145 mmol/L    Potassium 3.6 3.5 - 5.3 mmol/L    Chloride 97 (L) 98 - 107 mmol/L    Bicarbonate 29 21 - 32 mmol/L    Anion Gap 14 10 - 20 mmol/L    Urea Nitrogen 5 (L) 6 - 23 mg/dL    Creatinine 0.63 0.50 - 1.30 mg/dL    eGFR >90 >60 mL/min/1.73m*2    Calcium 9.3 8.6 - 10.6 mg/dL          Assessment/Plan   Principal Problem:    Lower GI bleed  Active Problems:    Alcohol use disorder    Bipolar depression (CMS/HCC)    48-year-old male with history of HTN, EtOH abuse c/b gastritis and depression, presents for chief complaint of blood in stool (bright red and \"dark spots\"), concerning for possible lower GI bleed. Baseline Hgb 12-14, was 10.7 on admission and has downtrended again to 8.8. ENMANUEL in ED with BRBPR and no obvious hemorrhoids. Markedly hypertensive this admission, but asymptomatic, continuing home BP meds. CIWA protocol in effect. CT abd/pel with mild bowel wall thickening (rectum and sigmoid), consistent with mild colitis. Patient HDS and appropriate for regular nursing floor. Detailed plan below:     4/2:   - pt is NPO pending EGD/colonoscopy today  - hgb stable     #GI bleed  - possibly mixed upper/lower, as patient reports both bright red stool and \"black spots,\" although predominantly bright red; chronic EtOH abuse increases risk of PUD or variceal bleeding, although no established history of cirrhosis and no hematemesis  - baseline Hgb 12-14 -> 10.7 -> 8.8 -> 10.1 // trend CBC BID  - transfuse for Hg<7  - pantoprazole 40mg IV BID  - also with new significant thrombocytopenia without other indicators for acute hepatic failure, will trend  - pt NPO for EGD/colonoscopy today 4/2    #AUD  - continue to monitor RFP, replete electrolytes as needed.  - started on folic acid, MV, and thiamine  - [facility] protocol  - pt deferring inpatient rehab     #bipolar depression   #previous suicide attempts  - hydroxyzine 50mg prn anxiety  - no current SI or HI, no " indication for inpatient psychiatry consultation    # HTN  - continue home losartan 50mg and hydrochlorothiazide 12.5mg    Code Status: Full Code   Emergency Contact: Extended Emergency Contact Information  Primary Emergency Contact: Rosa Wills  Home Phone: [phone]  Relation: Mother    Ronan Sparks, DO  PGY-1 Family Medicine

## 2024-04-02 NOTE — CARE PLAN
Problem: Pain  Goal: My pain/discomfort is manageable  Outcome: Progressing   The patient's goals for the shift include n/a    The clinical goals for the shift include Pt will finish his polyethylene glycol-electrolytes during this shift

## 2024-04-02 NOTE — PROGRESS NOTES
"   04/02/24 1020   Discharge Planning   Living Arrangements Alone   Support Systems None   Assistance Needed None   Type of Residence Private residence   Who is requesting discharge planning? Patient   Home or Post Acute Services None   Patient expects to be discharged to: Home   Does the patient need discharge transport arranged? Yes  (Pt is requesting a bus ticket.)   RoundTrip coordination needed? No   Has discharge transport been arranged? No   Financial Resource Strain   How hard is it for you to pay for the very basics like food, housing, medical care, and heating? Not hard   Housing Stability   In the last 12 months, was there a time when you were not able to pay the mortgage or rent on time? N   In the last 12 months, was there a time when you did not have a steady place to sleep or slept in a shelter (including now)? N   Transportation Needs   In the past 12 months, has lack of transportation kept you from medical appointments or from getting medications? no   In the past 12 months, has lack of transportation kept you from meetings, work, or from getting things needed for daily living? No     Assessment Note:  Met with pt and introduced myself as care coordinator and member of the Care Transitions team for discharge planning.   Pt feels safe at home, and independent prior to admission.  Pt uses insurance [employer] for drs appts.  Pt's address, phone number and contact information was verified.  Pt admits to having a problem with alcohol use, pt states \"but I stopped\".  Pt was offered alcohol cessation resources but declined.  Pt states, \"I just need to do my steps\".  Pt completed a rehab program last year.  Pt does not have any other questions/concerns at this time.     Previous Home Care: None  DME: None  Pharmacy: [facility]  Falls: Last week, pt went to the hospital (cannot remember details).  PCP:  SADIE Shepard (cannot recall drs name), last visit 2/2024.  Mental Health Services: Pt states he will call " [facility] to schedule mental health follow up.    Helene Aponte MSN, RN-BC  Transitional Care Coordinator (TCC)  [phone]

## 2024-04-02 NOTE — CARE PLAN
The patient's goals for the shift include n/a    The clinical goals for the shift include Patient will get endoscopy/colonoscopy today and remain free from injury    Patient remains free from injury.  He did not get his colonoscopy today and is scheduled for tomorrow.  He is now on clear liquid diet and will be NPO after midnight tonight.

## 2024-04-03 ENCOUNTER — APPOINTMENT (OUTPATIENT)
Dept: GASTROENTEROLOGY | Facility: HOSPITAL | Age: 49
End: 2024-04-03
Payer: MEDICAID

## 2024-04-03 ENCOUNTER — ANESTHESIA (OUTPATIENT)
Dept: GASTROENTEROLOGY | Facility: HOSPITAL | Age: 49
End: 2024-04-03
Payer: MEDICAID

## 2024-04-03 ENCOUNTER — ANESTHESIA EVENT (OUTPATIENT)
Dept: GASTROENTEROLOGY | Facility: HOSPITAL | Age: 49
End: 2024-04-03
Payer: MEDICAID

## 2024-04-03 ENCOUNTER — PHARMACY VISIT (OUTPATIENT)
Dept: PHARMACY | Facility: CLINIC | Age: 49
End: 2024-04-03
Payer: MEDICAID

## 2024-04-03 VITALS
WEIGHT: 137 LBS | RESPIRATION RATE: 15 BRPM | HEIGHT: 67 IN | SYSTOLIC BLOOD PRESSURE: 115 MMHG | DIASTOLIC BLOOD PRESSURE: 79 MMHG | HEART RATE: 61 BPM | BODY MASS INDEX: 21.5 KG/M2 | TEMPERATURE: 97 F | OXYGEN SATURATION: 100 %

## 2024-04-03 PROBLEM — I10 PRIMARY HYPERTENSION: Status: ACTIVE | Noted: 2024-04-03

## 2024-04-03 LAB
ANION GAP SERPL CALC-SCNC: 12 MMOL/L (ref 10–20)
BUN SERPL-MCNC: 3 MG/DL (ref 6–23)
CALCIUM SERPL-MCNC: 9.7 MG/DL (ref 8.6–10.6)
CHLORIDE SERPL-SCNC: 98 MMOL/L (ref 98–107)
CO2 SERPL-SCNC: 32 MMOL/L (ref 21–32)
CREAT SERPL-MCNC: 0.67 MG/DL (ref 0.5–1.3)
EGFRCR SERPLBLD CKD-EPI 2021: >90 ML/MIN/1.73M*2
ERYTHROCYTE [DISTWIDTH] IN BLOOD BY AUTOMATED COUNT: 20.8 % (ref 11.5–14.5)
GLUCOSE SERPL-MCNC: 84 MG/DL (ref 74–99)
HCT VFR BLD AUTO: 28.9 % (ref 41–52)
HGB BLD-MCNC: 10.5 G/DL (ref 13.5–17.5)
MCH RBC QN AUTO: 35.8 PG (ref 26–34)
MCHC RBC AUTO-ENTMCNC: 36.3 G/DL (ref 32–36)
MCV RBC AUTO: 99 FL (ref 80–100)
NRBC BLD-RTO: 0 /100 WBCS (ref 0–0)
PLATELET # BLD AUTO: 159 X10*3/UL (ref 150–450)
POTASSIUM SERPL-SCNC: 4.2 MMOL/L (ref 3.5–5.3)
RBC # BLD AUTO: 2.93 X10*6/UL (ref 4.5–5.9)
SODIUM SERPL-SCNC: 138 MMOL/L (ref 136–145)
WBC # BLD AUTO: 3.3 X10*3/UL (ref 4.4–11.3)

## 2024-04-03 PROCEDURE — 80048 BASIC METABOLIC PNL TOTAL CA: CPT

## 2024-04-03 PROCEDURE — C9113 INJ PANTOPRAZOLE SODIUM, VIA: HCPCS | Performed by: STUDENT IN AN ORGANIZED HEALTH CARE EDUCATION/TRAINING PROGRAM

## 2024-04-03 PROCEDURE — 7100000009 HC PHASE TWO TIME - INITIAL BASE CHARGE

## 2024-04-03 PROCEDURE — 2500000004 HC RX 250 GENERAL PHARMACY W/ HCPCS (ALT 636 FOR OP/ED)

## 2024-04-03 PROCEDURE — 2500000001 HC RX 250 WO HCPCS SELF ADMINISTERED DRUGS (ALT 637 FOR MEDICARE OP)

## 2024-04-03 PROCEDURE — 85027 COMPLETE CBC AUTOMATED: CPT

## 2024-04-03 PROCEDURE — 88305 TISSUE EXAM BY PATHOLOGIST: CPT | Performed by: PATHOLOGY

## 2024-04-03 PROCEDURE — 7100000010 HC PHASE TWO TIME - EACH INCREMENTAL 1 MINUTE

## 2024-04-03 PROCEDURE — 36415 COLL VENOUS BLD VENIPUNCTURE: CPT

## 2024-04-03 PROCEDURE — 2500000004 HC RX 250 GENERAL PHARMACY W/ HCPCS (ALT 636 FOR OP/ED): Performed by: STUDENT IN AN ORGANIZED HEALTH CARE EDUCATION/TRAINING PROGRAM

## 2024-04-03 PROCEDURE — 2500000005 HC RX 250 GENERAL PHARMACY W/O HCPCS

## 2024-04-03 PROCEDURE — 43239 EGD BIOPSY SINGLE/MULTIPLE: CPT | Performed by: INTERNAL MEDICINE

## 2024-04-03 PROCEDURE — 88305 TISSUE EXAM BY PATHOLOGIST: CPT | Mod: TC,SUR | Performed by: INTERNAL MEDICINE

## 2024-04-03 PROCEDURE — 3700000002 HC GENERAL ANESTHESIA TIME - EACH INCREMENTAL 1 MINUTE

## 2024-04-03 PROCEDURE — A45378 PR COLONOSCOPY,DIAGNOSTIC

## 2024-04-03 PROCEDURE — RXMED WILLOW AMBULATORY MEDICATION CHARGE

## 2024-04-03 PROCEDURE — 0DJD8ZZ INSPECTION OF LOWER INTESTINAL TRACT, VIA NATURAL OR ARTIFICIAL OPENING ENDOSCOPIC: ICD-10-PCS | Performed by: INTERNAL MEDICINE

## 2024-04-03 PROCEDURE — 0DB78ZX EXCISION OF STOMACH, PYLORUS, VIA NATURAL OR ARTIFICIAL OPENING ENDOSCOPIC, DIAGNOSTIC: ICD-10-PCS | Performed by: INTERNAL MEDICINE

## 2024-04-03 PROCEDURE — 3700000001 HC GENERAL ANESTHESIA TIME - INITIAL BASE CHARGE

## 2024-04-03 PROCEDURE — 45378 DIAGNOSTIC COLONOSCOPY: CPT | Performed by: INTERNAL MEDICINE

## 2024-04-03 PROCEDURE — 99239 HOSP IP/OBS DSCHRG MGMT >30: CPT

## 2024-04-03 PROCEDURE — A45378 PR COLONOSCOPY,DIAGNOSTIC: Performed by: ANESTHESIOLOGY

## 2024-04-03 RX ORDER — MULTIVIT-MIN/IRON FUM/FOLIC AC 7.5 MG-4
1 TABLET ORAL DAILY
Qty: 90 TABLET | Refills: 3 | Status: SHIPPED | OUTPATIENT
Start: 2024-04-04 | End: 2025-04-04

## 2024-04-03 RX ORDER — MIDAZOLAM HYDROCHLORIDE 1 MG/ML
INJECTION INTRAMUSCULAR; INTRAVENOUS AS NEEDED
Status: DISCONTINUED | OUTPATIENT
Start: 2024-04-03 | End: 2024-04-03

## 2024-04-03 RX ORDER — PHENYLEPHRINE HCL IN 0.9% NACL 1 MG/10 ML
SYRINGE (ML) INTRAVENOUS AS NEEDED
Status: DISCONTINUED | OUTPATIENT
Start: 2024-04-03 | End: 2024-04-03

## 2024-04-03 RX ORDER — AMLODIPINE BESYLATE 5 MG/1
5 TABLET ORAL DAILY
Status: DISCONTINUED | OUTPATIENT
Start: 2024-04-03 | End: 2024-04-03 | Stop reason: HOSPADM

## 2024-04-03 RX ORDER — PROPOFOL 10 MG/ML
INJECTION, EMULSION INTRAVENOUS AS NEEDED
Status: DISCONTINUED | OUTPATIENT
Start: 2024-04-03 | End: 2024-04-03

## 2024-04-03 RX ORDER — PANTOPRAZOLE SODIUM 40 MG/1
40 TABLET, DELAYED RELEASE ORAL
Qty: 90 TABLET | Refills: 3 | Status: SHIPPED | OUTPATIENT
Start: 2024-04-03 | End: 2025-04-03

## 2024-04-03 RX ORDER — AMLODIPINE BESYLATE 5 MG/1
5 TABLET ORAL DAILY
Qty: 90 TABLET | Refills: 3 | Status: SHIPPED | OUTPATIENT
Start: 2024-04-04 | End: 2025-04-04

## 2024-04-03 RX ORDER — FENTANYL CITRATE 50 UG/ML
INJECTION, SOLUTION INTRAMUSCULAR; INTRAVENOUS AS NEEDED
Status: DISCONTINUED | OUTPATIENT
Start: 2024-04-03 | End: 2024-04-03

## 2024-04-03 RX ORDER — PROPOFOL 10 MG/ML
INJECTION, EMULSION INTRAVENOUS CONTINUOUS PRN
Status: DISCONTINUED | OUTPATIENT
Start: 2024-04-03 | End: 2024-04-03

## 2024-04-03 RX ORDER — FOLIC ACID 1 MG/1
1 TABLET ORAL DAILY
Qty: 90 TABLET | Refills: 3 | Status: SHIPPED | OUTPATIENT
Start: 2024-04-04 | End: 2025-04-04

## 2024-04-03 RX ORDER — LIDOCAINE HCL/PF 100 MG/5ML
SYRINGE (ML) INTRAVENOUS AS NEEDED
Status: DISCONTINUED | OUTPATIENT
Start: 2024-04-03 | End: 2024-04-03

## 2024-04-03 RX ADMIN — PROPOFOL 200 MCG/KG/MIN: 10 INJECTION, EMULSION INTRAVENOUS at 13:36

## 2024-04-03 RX ADMIN — SODIUM CHLORIDE, SODIUM LACTATE, POTASSIUM CHLORIDE, AND CALCIUM CHLORIDE: 600; 310; 30; 20 INJECTION, SOLUTION INTRAVENOUS at 13:31

## 2024-04-03 RX ADMIN — MIDAZOLAM HYDROCHLORIDE 2 MG: 1 INJECTION, SOLUTION INTRAMUSCULAR; INTRAVENOUS at 13:31

## 2024-04-03 RX ADMIN — LOSARTAN POTASSIUM: 50 TABLET, FILM COATED ORAL at 09:18

## 2024-04-03 RX ADMIN — PROPOFOL 50 MG: 10 INJECTION, EMULSION INTRAVENOUS at 13:36

## 2024-04-03 RX ADMIN — PANTOPRAZOLE SODIUM 40 MG: 40 INJECTION, POWDER, FOR SOLUTION INTRAVENOUS at 09:18

## 2024-04-03 RX ADMIN — FENTANYL CITRATE 25 MCG: 50 INJECTION, SOLUTION INTRAMUSCULAR; INTRAVENOUS at 13:40

## 2024-04-03 RX ADMIN — FENTANYL CITRATE 50 MCG: 50 INJECTION, SOLUTION INTRAMUSCULAR; INTRAVENOUS at 13:36

## 2024-04-03 RX ADMIN — AMLODIPINE BESYLATE 5 MG: 5 TABLET ORAL at 09:50

## 2024-04-03 RX ADMIN — FOLIC ACID 1 MG: 1 TABLET ORAL at 09:18

## 2024-04-03 RX ADMIN — LIDOCAINE HYDROCHLORIDE 60 MG: 20 INJECTION INTRAVENOUS at 13:36

## 2024-04-03 RX ADMIN — Medication 1 TABLET: at 09:19

## 2024-04-03 RX ADMIN — THIAMINE HCL TAB 100 MG 100 MG: 100 TAB at 09:18

## 2024-04-03 RX ADMIN — Medication 120 MCG: at 14:02

## 2024-04-03 RX ADMIN — Medication 120 MCG: at 13:48

## 2024-04-03 SDOH — HEALTH STABILITY: MENTAL HEALTH: CURRENT SMOKER: 1

## 2024-04-03 ASSESSMENT — PAIN SCALES - GENERAL
PAINLEVEL_OUTOF10: 0 - NO PAIN

## 2024-04-03 ASSESSMENT — COGNITIVE AND FUNCTIONAL STATUS - GENERAL
DAILY ACTIVITIY SCORE: 24
MOBILITY SCORE: 24
DAILY ACTIVITIY SCORE: 24
MOBILITY SCORE: 24

## 2024-04-03 ASSESSMENT — PAIN - FUNCTIONAL ASSESSMENT
PAIN_FUNCTIONAL_ASSESSMENT: 0-10

## 2024-04-03 ASSESSMENT — PAIN SCALES - WONG BAKER: WONGBAKER_NUMERICALRESPONSE: NO HURT

## 2024-04-03 NOTE — ANESTHESIA POSTPROCEDURE EVALUATION
Patient: Aguilar Wills    Procedure Summary       Date: 04/03/24 Room / Location: [facility]    Anesthesia Start: 1331 Anesthesia Stop: 1404    Procedure: COLONOSCOPY Diagnosis: Lower GI bleed    Scheduled Providers: Manjeet Mandel MD Responsible Provider: Nadine Mcnair MD    Anesthesia Type: MAC ASA Status: 3            Anesthesia Type: MAC        Anesthesia Post Evaluation    Patient location during evaluation: PACU  Patient participation: complete - patient participated  Level of consciousness: awake and alert  Pain management: adequate  Airway patency: patent  Cardiovascular status: acceptable and hemodynamically stable  Respiratory status: acceptable  Hydration status: acceptable  Postoperative Nausea and Vomiting: none      Vitals:    04/03/24 1310   BP: 138/63   Pulse: 58   Resp: 18   Temp: 36 °C (96.8 °F)   SpO2: 99%      There were no known notable events for this encounter.

## 2024-04-03 NOTE — PROGRESS NOTES
"Gastroenterology Consult Service Progress Note  Department of Gastroenterology & Hepatology  [facility] [place] [facility]    [facility]  Date of Service  April 3, 2024   Patient: Aguilar Wills    Medical Record: [id number]    Interval History:   Plan for EGD/colonoscopy today. Completed colonoscopy prep.    Vital Signs:  Vitals:    04/03/24 1310 04/03/24 1400 04/03/24 1415 04/03/24 1430   BP: 138/63 94/52 119/72 115/79   Pulse: 58 63 66 61   Resp: 18 14 19 15   Temp: 36 °C (96.8 °F) 36.1 °C (97 °F)     TempSrc:  Temporal     SpO2: 99% 99% 100% 100%   Weight:       Height:           Physical Exam:  General appearance: well appearing, no acute distress  Skin: no jaundice  Head: normal  Eyes: anicteric sclera  Lungs: lungs clear to auscultation, no wheezing or rhonchi  Heart: RRR without murmur, gallop, or rubs.  No ectopy  Abdomen: Normal abdominal exam, Abdomen soft, non-tender. Bowel sounds normal. No masses, organomegaly  Extremities: Extremities normal. No lower extremity edema.  Neuro: AOx3.    Diagnostic Testing:  Labs:  Lab Results   Component Value Date    WBC 3.3 (L) 04/03/2024    HGB 10.5 (L) 04/03/2024    HCT 28.9 (L) 04/03/2024    MCV 99 04/03/2024     04/03/2024     Lab Results   Component Value Date    GLUCOSE 84 04/03/2024    CALCIUM 9.7 04/03/2024     04/03/2024    K 4.2 04/03/2024    CO2 32 04/03/2024    CL 98 04/03/2024    BUN 3 (L) 04/03/2024    CREATININE 0.67 04/03/2024     Lab Results   Component Value Date    ALT 7 (L) 03/30/2024    AST 33 03/30/2024    ALKPHOS 79 03/30/2024    BILITOT 1.1 03/30/2024     No results found for: \"IRON\", \"TIBC\", \"FERRITIN\"    Reviewed:  Recent pertinent imaging/procedures    Assessment:     Aguilar Wills is a 48 y.o. male with a PMH of HTN, alcohol use disorder (daily fifth of liquor, last drink 3/28/24), alcohol-induced pancreatitis, bipolar depression s/p 5 previous suicide attempts (last 2 weeks prior w/ 1 bottle of " aspirin) who presented to [facility] 3/30/24 due to 1 day of painless bloody diarrhea.      Gastroenterology is consulted for painless bloody diarrhea. Consideration for infectious diarrhea, diverticulosis, hemorrhoids, colonic ulcer, versus malignancy based on clinical history and imaging. Additional considerations of prior UGIB in setting of midepigastric abdominal pain, NSAID usage, and melena. Acuity of Hgb drop unclear but low concern for active GIB in setting of >12 hours w/o BM and stable Hgb. No prior endoscopic evaluation. Significant alcohol usage with thrombocytopenia but imaging not consistent with decompensated cirrhosis.    Colonoscopy 4/3/24:  Poor prep.   Small hemorrhoids    EGD 4/3/24:  The esophagus appeared normal.  Mild erythematous mucosa in the antrum; performed cold forceps biopsy  The duodenal bulb and 2nd part of the duodenum appeared normal.    Updates 4/3/24:  - etiology of recent BRBPR unclear with consideration for hemorrhoids versus diverticulosis not adequately seen on colonoscopy due to poor prep    Plan:        - start pantoprazole 40 mg PO daily  - outpatient coordination of repeat colonoscopy with extended prep for appropriate CRC screening  - encourage alcohol cessation  - defer supportive care per primary team    Patient staffed with Dr. Carlos Amaya.  Thank you for the consultation.  The consulting team will sign off now.  Please do not hesitate to contact us again on by paging the consultation team again between the weekday hours of 7 AM - 5 PM.  If there is an urgent concern during the weekend, after-hours, or holidays; then please page the [place] GI fellow at [phone]. Thank you.    SIGNATURE: Kimmy Fernandez MD PATIENT NAME: Aguilar Wills   DATE: April 3, 2024 MRN: [id number]

## 2024-04-03 NOTE — ANESTHESIA PREPROCEDURE EVALUATION
Patient: Aguilar Wills    Procedure Information       Date/Time: 04/03/24 1300    Scheduled providers: Manjeet Mandel MD    Procedure: COLONOSCOPY    Location: [facility]        Aguilar Wills is a 48 y.o. male with history of HTN, substance use disorder with alcohol (drinks a fifth of vodka daily, last drink on 3/28) and alcohol induced gastritis, as well as bipolar depression, and 5 previous suicide attempts, presents for chief complaint of blood in stool     Relevant Problems   Neuro   (+) Bipolar depression (CMS/HCC)      GI   (+) Lower GI bleed       Chemistry    Lab Results   Component Value Date/Time     04/03/2024 0942    K 4.2 04/03/2024 0942    CL 98 04/03/2024 0942    CO2 32 04/03/2024 0942    BUN 3 (L) 04/03/2024 0942    CREATININE 0.67 04/03/2024 0942    Lab Results   Component Value Date/Time    CALCIUM 9.7 04/03/2024 0942    ALKPHOS 79 03/30/2024 0908    AST 33 03/30/2024 0908    ALT 7 (L) 03/30/2024 0908    BILITOT 1.1 03/30/2024 0908          Lab Results   Component Value Date/Time    WBC 3.3 (L) 04/03/2024 0942    HGB 10.5 (L) 04/03/2024 0942    HCT 28.9 (L) 04/03/2024 0942     04/03/2024 0942     Lab Results   Component Value Date/Time    PROTIME 10.9 03/30/2024 0908    INR 1.0 03/30/2024 0908     Encounter Date: 02/02/24   Electrocardiogram, 12-lead   Result Value    Ventricular Rate 70    Atrial Rate 70    AR Interval 164    QRS Duration 98    QT Interval 404    QTC Calculation(Bazett) 436    P Axis 80    R Axis 89    T Axis 82    QRS Count 12    Q Onset 218    P Onset 136    P Offset 183    T Offset 420    QTC Fredericia 425    Narrative    See ED provider note for full interpretation and clinical correlation  Normal sinus rhythm  Normal ECG  When compared with ECG of 28-NOV-2023 17:28,  Previous ECG has undetermined rhythm, needs review  Confirmed by Lauren Christianson ([id number]) on 2/5/2024 3:42:26 AM     No results found for this or any previous visit from the past 1095  days.   Clinical information reviewed:   Tobacco  Allergies  Meds   Med Hx  Surg Hx   Fam Hx  Soc Hx      Allergies   Allergen Reactions    Penicillins Hives and Unknown      NPO Detail:  NPO/Void Status  Date of Last Liquid: 04/02/24  Date of Last Solid: 04/02/24         Physical Exam    Airway  Mallampati: I  TM distance: >3 FB  Neck ROM: full     Cardiovascular    Dental   (+) lower dentures, upper dentures     Pulmonary    Abdominal          Anesthesia Plan    History of general anesthesia?: yes  History of complications of general anesthesia?: no    ASA 3     MAC   (Talked to Dr Mandel , the pt  has not active bleeding , H,H is table ,no need to ETT )  The patient is a current smoker.  Patient did not smoke on day of procedure.    intravenous induction   Anesthetic plan and risks discussed with patient.  Use of blood products discussed with patient who consented to blood products.    Plan discussed with CRNA and CAA.

## 2024-04-03 NOTE — CARE PLAN
The patient's goals for the shift include n/a    The clinical goals for the shift include Patient will have EGD/colonoscopy by end of shift      Patient had EGD/ Colonoscopy. Tolerated Regular Diet. Patient being discharged home with no needs. IV removed. Bus tickets provided.            Problem: Pain  Goal: My pain/discomfort is manageable  Outcome: Progressing     Problem: Safety  Goal: Patient will be injury free during hospitalization  Outcome: Progressing  Goal: I will remain free of falls  Outcome: Progressing     Problem: Daily Care  Goal: Daily care needs are met  4/3/2024 1807 by Quynh Linares RN  Outcome: Progressing  4/3/2024 0921 by Quynh Linares RN  Flowsheets (Taken 4/3/2024 0921)  Daily care needs are met: Encourage independent activity per ability     Problem: Psychosocial Needs  Goal: Demonstrates ability to cope with hospitalization/illness  Outcome: Progressing  Goal: Collaborate with me, my family, and caregiver to identify my specific goals  Outcome: Progressing     Problem: Discharge Barriers  Goal: My discharge needs are met  Outcome: Progressing     Problem: Pain - Adult  Goal: Verbalizes/displays adequate comfort level or baseline comfort level  Outcome: Progressing     Problem: Safety - Adult  Goal: Free from fall injury  Outcome: Progressing     Problem: Discharge Planning  Goal: Discharge to home or other facility with appropriate resources  Outcome: Progressing     Problem: Chronic Conditions and Co-morbidities  Goal: Patient's chronic conditions and co-morbidity symptoms are monitored and maintained or improved  Outcome: Progressing

## 2024-04-03 NOTE — DOCUMENTATION CLARIFICATION NOTE
"    PATIENT:               HUBER SIDHU  ACCT #:                  [id number]  MRN:                       [id number]  :                       1975  ADMIT DATE:       3/30/2024 8:52 AM  DISCH DATE:  RESPONDING PROVIDER #:        [id number]          PROVIDER RESPONSE TEXT:    Acute blood loss anemia    CDI QUERY TEXT:    [facility]_Anemia Specificity        Instruction:    Based on your assessment of the patient and the clinical information, please provide the requested documentation by clicking on the appropriate radio button and enter any additional information if prompted.    Question: Please further clarify the diagnosis of anemia as              When answering this query, please exercise your independent professional judgment. The fact that a question is being asked, does not imply that any particular answer is desired or expected.    The patient's clinical indicators include:  Clinical Information:  48-year-old male with history of HTN, EtOH abuse c/b gastritis and depression, presents for chief complaint of blood in stool (bright red and \"dark spots\"), concerning for possible lower GI bleed.    Clinical Indicators:  3/30 H and P: ?  CBC with differential shows mild anemia with hemoglobin 10.7 ?    - Primary Care-?  GI bleed--possibly mixed upper/lower, as patient reports both bright red stool and \"black spots,\" although predominantly bright red; chronic EtOH abuse increases risk of PUD or variceal bleeding, although no established history of cirrhosis and no hematemesis  - baseline Hgb 12 - 14 - 10.7 - 8.8  - trend CBC BID  - pantoprazole 40mg IV BID  - NPO at midnight for EGD/colonoscopy ?    Treatment:  IV pantoprazole, trend CBC bid, EGD/colonoscopy  Risk Factors:  alcohol abuse, hx gastritis  Options provided:  -- Acute blood loss anemia  -- Other - I will add my own diagnosis  -- Refer to Clinical Documentation Reviewer    Query created by: Sharon Stringer on 2024 6:15 PM      Electronically signed " by:  CLEVELAND DAVID DO 4/3/2024 10:09 AM

## 2024-04-03 NOTE — ANESTHESIA PROCEDURE NOTES
Peripheral IV  Date/Time: 4/3/2024 1:48 PM      Placement  Needle size: 18 G  Laterality: right  Location: hand  Site prep: alcohol  Technique: anatomical landmarks  Attempts: 1

## 2024-04-04 NOTE — HOSPITAL COURSE
Aguilar Wills is a 48 y.o. male with history of HTN, substance use disorder with alcohol (drinks a fifth of vodka daily, last drink on 3/28) and alcohol induced gastritis, as well as bipolar depression, and 5 previous suicide attempts presented with one day of bloody stool in the setting of a recent suicide attempt by consumption of an aspirin bottle. Pt denied SI/HI during this admission. Labs on presentation notable for Hgb 10.7 (b/l Hgb 14 2/3/24) / Plt 82 / INR 1.0 / BUN 8 / Cr 0.59 / Alcohol 275. Repeat Hgb 8.8-8.9. CT AP noted mild bowel wall thickening involving the rectum and sigmoid colon, hepatic steatosis. There were no signs of active bleeding while admitted to the hospital and hemoglobin stabilized. EGD/colonoscopy were performed which showed mild erythematous mucosa in the esophagus and gastric antrum but otherwise no signs of bleeding. Biopsies were taken to assess for H. Pylori which will require PCP follow up to review. Per GI, pt was placed on pantoprazole 40mg daily with no need for outpatient follow up.

## 2024-04-04 NOTE — DISCHARGE SUMMARY
Discharge Diagnosis  Lower GI bleed    Issues Requiring Follow-Up  - close behavioral health FU    Test Results Pending At Discharge  Pending Labs       Order Current Status    Surgical Pathology Exam Collected (04/03/24 1340)            Hospital Course  Aguilar Wills is a 48 y.o. male with history of HTN, substance use disorder with alcohol (drinks a fifth of vodka daily, last drink on 3/28) and alcohol induced gastritis, as well as bipolar depression, and 5 previous suicide attempts presented with one day of bloody stool in the setting of a recent suicide attempt by consumption of an aspirin bottle. Labs on presentation notable for Hgb 10.7 (b/l Hgb 14 2/3/24) / Plt 82 / INR 1.0 / BUN 8 / Cr 0.59 / Alcohol 275. Repeat Hgb 8.8-8.9. CT AP noted mild bowel wall thickening involving the rectum and sigmoid colon, hepatic steatosis. There were no signs of active bleeding while admitted to the hospital and hemoglobin stabilized. EGD/colonoscopy were performed which showed mild erythematous mucosa in the esophagus and gastric antrum but otherwise no signs of bleeding. Biopsies were taken to assess for H. Pylori which will require PCP follow up to review. Per GI, pt was placed on pantoprazole 40mg daily with no need for outpatient follow up.     Pertinent Physical Exam At Time of Discharge  Physical Exam  Vitals reviewed.   Constitutional:       General: He is not in acute distress.     Appearance: Normal appearance. He is not ill-appearing.   HENT:      Nose: Nose normal.   Eyes:      General: Scleral icterus present.   Cardiovascular:      Rate and Rhythm: Normal rate and regular rhythm.      Pulses: Normal pulses.      Heart sounds: Normal heart sounds.   Pulmonary:      Effort: Pulmonary effort is normal.      Breath sounds: Normal breath sounds.   Abdominal:      General: There is no distension.      Palpations: Abdomen is soft.      Tenderness: There is no abdominal tenderness.   Skin:     General: Skin is warm and  dry.      Capillary Refill: Capillary refill takes less than 2 seconds.   Neurological:      Mental Status: He is alert and oriented to person, place, and time. Mental status is at baseline.   Psychiatric:         Attention and Perception: Attention normal.         Mood and Affect: Mood normal. Mood is not anxious or depressed.         Speech: Speech normal.         Behavior: Behavior normal. Behavior is not aggressive or withdrawn.         Thought Content: Thought content does not include homicidal or suicidal ideation.         Cognition and Memory: Cognition normal.         Judgment: Judgment normal.       Home Medications     Medication List      START taking these medications     amLODIPine 5 mg tablet; Commonly known as: Norvasc; Take 1 tablet (5 mg)   by mouth once daily. Do not start before April 4, 2024.; Start taking on:   April 4, 2024   Certavite-Antioxidant tablet; Generic drug: multivitamin with minerals;   Take 1 tablet by mouth once daily. Do not start before April 4, 2024.;   Start taking on: April 4, 2024   folic acid 1 mg tablet; Commonly known as: Folvite; Take 1 tablet (1 mg)   by mouth once daily. Do not start before April 4, 2024.; Start taking on:   April 4, 2024   losartan 50 mg tablet 50 mg, hydroCHLOROthiazide 25 mg tablet 12.5 mg;   Take 1 Units by mouth once daily. Do not start before April 4, 2024.;   Start taking on: April 4, 2024   pantoprazole 40 mg EC tablet; Commonly known as: ProtoNix; Take 1 tablet   (40 mg) by mouth once daily in the morning. Take before meals. Do not   crush, chew, or split.     CONTINUE taking these medications     hydrOXYzine HCL 25 mg tablet; Commonly known as: Atarax; Take 2 tablets   (50 mg) by mouth as needed at bedtime (insomnia).       Outpatient Follow-Up  - FU with PCP and behavioral health ordered    Ronan Sparks DO  PGY-1 Family Medicine

## 2024-04-08 LAB
LABORATORY COMMENT REPORT: NORMAL
PATH REPORT.FINAL DX SPEC: NORMAL
PATH REPORT.GROSS SPEC: NORMAL
PATH REPORT.TOTAL CANCER: NORMAL

## 2024-05-29 ENCOUNTER — HOSPITAL ENCOUNTER (EMERGENCY)
Facility: HOSPITAL | Age: 49
Discharge: OTHER INSTITUTION | End: 2024-05-30
Attending: EMERGENCY MEDICINE
Payer: MEDICAID

## 2024-05-29 ENCOUNTER — CLINICAL SUPPORT (OUTPATIENT)
Dept: EMERGENCY MEDICINE | Facility: HOSPITAL | Age: 49
End: 2024-05-29
Payer: MEDICAID

## 2024-05-29 DIAGNOSIS — F10.920 ALCOHOLIC INTOXICATION WITHOUT COMPLICATION (CMS-HCC): Primary | ICD-10-CM

## 2024-05-29 DIAGNOSIS — R45.851 SUICIDAL IDEATION: ICD-10-CM

## 2024-05-29 LAB
ALBUMIN SERPL BCP-MCNC: 4.9 G/DL (ref 3.4–5)
ALP SERPL-CCNC: 80 U/L (ref 33–120)
ALT SERPL W P-5'-P-CCNC: 15 U/L (ref 10–52)
AMPHETAMINES UR QL SCN: NORMAL
ANION GAP SERPL CALC-SCNC: 19 MMOL/L (ref 10–20)
APAP SERPL-MCNC: <10 UG/ML
APPEARANCE UR: CLEAR
AST SERPL W P-5'-P-CCNC: 43 U/L (ref 9–39)
BACTERIA #/AREA URNS AUTO: ABNORMAL /HPF
BARBITURATES UR QL SCN: NORMAL
BASOPHILS # BLD MANUAL: 0 X10*3/UL (ref 0–0.1)
BASOPHILS NFR BLD MANUAL: 0 %
BENZODIAZ UR QL SCN: NORMAL
BILIRUB SERPL-MCNC: 0.4 MG/DL (ref 0–1.2)
BILIRUB UR STRIP.AUTO-MCNC: NEGATIVE MG/DL
BUN SERPL-MCNC: 12 MG/DL (ref 6–23)
BZE UR QL SCN: NORMAL
CALCIUM SERPL-MCNC: 9.5 MG/DL (ref 8.6–10.6)
CANNABINOIDS UR QL SCN: NORMAL
CHLORIDE SERPL-SCNC: 102 MMOL/L (ref 98–107)
CO2 SERPL-SCNC: 25 MMOL/L (ref 21–32)
COLOR UR: NORMAL
CREAT SERPL-MCNC: 0.62 MG/DL (ref 0.5–1.3)
EGFRCR SERPLBLD CKD-EPI 2021: >90 ML/MIN/1.73M*2
EOSINOPHIL # BLD MANUAL: 0.07 X10*3/UL (ref 0–0.7)
EOSINOPHIL NFR BLD MANUAL: 1.8 %
ERYTHROCYTE [DISTWIDTH] IN BLOOD BY AUTOMATED COUNT: 16.2 % (ref 11.5–14.5)
ETHANOL SERPL-MCNC: 334 MG/DL
FENTANYL+NORFENTANYL UR QL SCN: NORMAL
GLUCOSE SERPL-MCNC: 92 MG/DL (ref 74–99)
GLUCOSE UR STRIP.AUTO-MCNC: NORMAL MG/DL
HCT VFR BLD AUTO: 35.4 % (ref 41–52)
HGB BLD-MCNC: 12.9 G/DL (ref 13.5–17.5)
HOLD SPECIMEN: NORMAL
IMM GRANULOCYTES # BLD AUTO: 0.01 X10*3/UL (ref 0–0.7)
IMM GRANULOCYTES NFR BLD AUTO: 0.3 % (ref 0–0.9)
KETONES UR STRIP.AUTO-MCNC: NEGATIVE MG/DL
LEUKOCYTE ESTERASE UR QL STRIP.AUTO: NEGATIVE
LYMPHOCYTES # BLD MANUAL: 3.28 X10*3/UL (ref 1.2–4.8)
LYMPHOCYTES NFR BLD MANUAL: 84.2 %
MCH RBC QN AUTO: 33.9 PG (ref 26–34)
MCHC RBC AUTO-ENTMCNC: 36.4 G/DL (ref 32–36)
MCV RBC AUTO: 93 FL (ref 80–100)
METHADONE UR QL SCN: NORMAL
MONOCYTES # BLD MANUAL: 0.1 X10*3/UL (ref 0.1–1)
MONOCYTES NFR BLD MANUAL: 2.6 %
MYELOCYTES # BLD MANUAL: 0.04 X10*3/UL
MYELOCYTES NFR BLD MANUAL: 0.9 %
NEUTS SEG # BLD MANUAL: 0.37 X10*3/UL (ref 1.2–7)
NEUTS SEG NFR BLD MANUAL: 9.6 %
NITRITE UR QL STRIP.AUTO: NEGATIVE
NRBC BLD-RTO: 0 /100 WBCS (ref 0–0)
OPIATES UR QL SCN: NORMAL
OXYCODONE+OXYMORPHONE UR QL SCN: NORMAL
PCP UR QL SCN: NORMAL
PH UR STRIP.AUTO: 5.5 [PH]
PLATELET # BLD AUTO: 209 X10*3/UL (ref 150–450)
POTASSIUM SERPL-SCNC: 3.8 MMOL/L (ref 3.5–5.3)
PROT SERPL-MCNC: 8.2 G/DL (ref 6.4–8.2)
PROT UR STRIP.AUTO-MCNC: NORMAL MG/DL
RBC # BLD AUTO: 3.8 X10*6/UL (ref 4.5–5.9)
RBC # UR STRIP.AUTO: NEGATIVE /UL
RBC #/AREA URNS AUTO: ABNORMAL /HPF
RBC MORPH BLD: ABNORMAL
SALICYLATES SERPL-MCNC: <3 MG/DL
SARS-COV-2 RNA RESP QL NAA+PROBE: NOT DETECTED
SODIUM SERPL-SCNC: 142 MMOL/L (ref 136–145)
SP GR UR STRIP.AUTO: 1.01
STOMATOCYTES BLD QL SMEAR: ABNORMAL
TARGETS BLD QL SMEAR: ABNORMAL
TOTAL CELLS COUNTED BLD: 114
UROBILINOGEN UR STRIP.AUTO-MCNC: NORMAL MG/DL
VARIANT LYMPHS # BLD MANUAL: 0.04 X10*3/UL (ref 0–0.5)
VARIANT LYMPHS NFR BLD: 0.9 %
WBC # BLD AUTO: 3.9 X10*3/UL (ref 4.4–11.3)
WBC #/AREA URNS AUTO: ABNORMAL /HPF

## 2024-05-29 PROCEDURE — 87635 SARS-COV-2 COVID-19 AMP PRB: CPT

## 2024-05-29 PROCEDURE — 80143 DRUG ASSAY ACETAMINOPHEN: CPT

## 2024-05-29 PROCEDURE — 99285 EMERGENCY DEPT VISIT HI MDM: CPT | Mod: CS

## 2024-05-29 PROCEDURE — 85027 COMPLETE CBC AUTOMATED: CPT

## 2024-05-29 PROCEDURE — 93005 ELECTROCARDIOGRAM TRACING: CPT

## 2024-05-29 PROCEDURE — 99285 EMERGENCY DEPT VISIT HI MDM: CPT | Performed by: EMERGENCY MEDICINE

## 2024-05-29 PROCEDURE — 80320 DRUG SCREEN QUANTALCOHOLS: CPT

## 2024-05-29 PROCEDURE — 81001 URINALYSIS AUTO W/SCOPE: CPT | Mod: 59

## 2024-05-29 PROCEDURE — 36415 COLL VENOUS BLD VENIPUNCTURE: CPT

## 2024-05-29 PROCEDURE — 93010 ELECTROCARDIOGRAM REPORT: CPT | Performed by: EMERGENCY MEDICINE

## 2024-05-29 PROCEDURE — 85007 BL SMEAR W/DIFF WBC COUNT: CPT

## 2024-05-29 PROCEDURE — 80048 BASIC METABOLIC PNL TOTAL CA: CPT

## 2024-05-29 PROCEDURE — 80307 DRUG TEST PRSMV CHEM ANLYZR: CPT

## 2024-05-29 SDOH — HEALTH STABILITY: MENTAL HEALTH: HAVE YOU ACTUALLY HAD ANY THOUGHTS OF KILLING YOURSELF?: NO

## 2024-05-29 SDOH — HEALTH STABILITY: MENTAL HEALTH: SUICIDE ASSESSMENT: ADULT (C-SSRS)

## 2024-05-29 SDOH — HEALTH STABILITY: MENTAL HEALTH: ACTIVE SUICIDAL IDEATION WITH SOME INTENT TO ACT, WITHOUT SPECIFIC PLAN (PAST 1 MONTH): YES

## 2024-05-29 SDOH — HEALTH STABILITY: MENTAL HEALTH: SUICIDAL BEHAVIOR (LIFETIME): YES

## 2024-05-29 SDOH — ECONOMIC STABILITY: HOUSING INSECURITY: FEELS SAFE LIVING IN HOME: YES

## 2024-05-29 SDOH — HEALTH STABILITY: MENTAL HEALTH: HAVE YOU EVER DONE ANYTHING, STARTED TO DO ANYTHING, OR PREPARED TO DO ANYTHING TO END YOUR LIFE?: NO

## 2024-05-29 SDOH — HEALTH STABILITY: MENTAL HEALTH: HAVE YOU WISHED YOU WERE DEAD OR WISHED YOU COULD GO TO SLEEP AND NOT WAKE UP?: NO

## 2024-05-29 SDOH — HEALTH STABILITY: MENTAL HEALTH: CONTENT: UNREMARKABLE

## 2024-05-29 SDOH — HEALTH STABILITY: MENTAL HEALTH: WISH TO BE DEAD (PAST 1 MONTH): YES

## 2024-05-29 SDOH — HEALTH STABILITY: MENTAL HEALTH

## 2024-05-29 SDOH — HEALTH STABILITY: MENTAL HEALTH: SLEEP PATTERN: NAPS DURING THE DAY

## 2024-05-29 SDOH — HEALTH STABILITY: MENTAL HEALTH: ACTIVE SUICIDAL IDEATION WITH SPECIFIC PLAN AND INTENT (PAST 1 MONTH): YES

## 2024-05-29 SDOH — HEALTH STABILITY: MENTAL HEALTH: ANXIETY SYMPTOMS: NO PROBLEMS REPORTED OR OBSERVED.

## 2024-05-29 SDOH — HEALTH STABILITY: MENTAL HEALTH: SUICIDAL BEHAVIOR (3 MONTHS): NO

## 2024-05-29 SDOH — HEALTH STABILITY: MENTAL HEALTH: BEHAVIORS/MOOD: PACING;FLIRTATIOUS;CALM;COOPERATIVE

## 2024-05-29 SDOH — HEALTH STABILITY: MENTAL HEALTH: BEHAVIORS/MOOD: COOPERATIVE;CALM

## 2024-05-29 SDOH — HEALTH STABILITY: MENTAL HEALTH: NON-SPECIFIC ACTIVE SUICIDAL THOUGHTS (PAST 1 MONTH): YES

## 2024-05-29 SDOH — HEALTH STABILITY: MENTAL HEALTH: NEEDS EXPRESSED: DENIES

## 2024-05-29 SDOH — HEALTH STABILITY: MENTAL HEALTH: DEPRESSION SYMPTOMS: FEELINGS OF HOPELESSESS;FEELINGS OF WORTHLESSNESS;ISOLATIVE;LOSS OF INTEREST;SLEEP DISTURBANCE

## 2024-05-29 ASSESSMENT — COLUMBIA-SUICIDE SEVERITY RATING SCALE - C-SSRS
1. IN THE PAST MONTH, HAVE YOU WISHED YOU WERE DEAD OR WISHED YOU COULD GO TO SLEEP AND NOT WAKE UP?: NO
2. HAVE YOU ACTUALLY HAD ANY THOUGHTS OF KILLING YOURSELF?: NO
6. HAVE YOU EVER DONE ANYTHING, STARTED TO DO ANYTHING, OR PREPARED TO DO ANYTHING TO END YOUR LIFE?: NO

## 2024-05-29 ASSESSMENT — LIFESTYLE VARIABLES
EVER HAD A DRINK FIRST THING IN THE MORNING TO STEADY YOUR NERVES TO GET RID OF A HANGOVER: NO
HAVE PEOPLE ANNOYED YOU BY CRITICIZING YOUR DRINKING: NO
HAVE YOU EVER FELT YOU SHOULD CUT DOWN ON YOUR DRINKING: NO
SUBSTANCE_ABUSE_PAST_12_MONTHS: NO
PRESCIPTION_ABUSE_PAST_12_MONTHS: NO
TOTAL SCORE: 0
EVER FELT BAD OR GUILTY ABOUT YOUR DRINKING: NO

## 2024-05-29 ASSESSMENT — PAIN SCALES - GENERAL
PAINLEVEL_OUTOF10: 0 - NO PAIN
PAINLEVEL_OUTOF10: 0 - NO PAIN

## 2024-05-29 ASSESSMENT — PAIN - FUNCTIONAL ASSESSMENT: PAIN_FUNCTIONAL_ASSESSMENT: 0-10

## 2024-05-29 NOTE — ED TRIAGE NOTES
"Patient arrived via [employer] for \"crisis intervention.\" Per [employer], the patient called and someone at the gas station called. Patient admits to drinking alcohol. He states \"I drank the whole [place].\" Denies SI/HI, AH/VH. Patient states \"I want to go to [facility]. My doctor gave me meds for bipolar that made me feel good.\" States he hasn't taken his meds since he was put on them.  "

## 2024-05-29 NOTE — PROGRESS NOTES
"This patient was signed out to me by outgoing provider.  Please see their note for initial patient presentation and work-up.  In brief, this is a 48-year-old male with history of bipolar, depression, substance use disorder with alcohol presented initially for chief complaint of intoxication and requesting to go to inpatient psychiatry because he is off his medications.    Was found in gas station by [place] [employer].  Was brought here to the ED and workup was performed.  Found to have elevated alcohol level at 334.  CBC with differential showed mild stable anemia and mild leukopenia, both within patient's normal limits.  Urinalysis shows no evidence of UTI.  CMP unremarkable.  Drug screen otherwise unremarkable.  COVID not detected on PCR test.  Patient was set to be evaluated by EPAT versus possibly discharged home after he metabolizes and can be reevaluated to sober.    On my arrival he is currently alert and sitting in bed without any further complaints.  Clinically intoxicated appearing.  We will reevaluate when he is clinically sober.    He was able to become sober some hours later.  I was able to reevaluate the patient.  He did endorse feeling suicidal currently.  He has plan to take pills and does have access to pills at home.  He has attempted suicide in the past by taking pills.  Denies HI or any hallucinations of any kind.  States that he feels overall improved since arrival.  States that he is been down and depressed for the last month since finding out his \"baby's mother \".    EPAT consult was placed.  Patient in agreement.  Remained calm and cooperative in stable condition awaiting EPAT.      CBC and Auto Differential        Component Value Flag Ref Range Units Status    WBC 3.9      4.4 - 11.3 x10*3/uL Final    nRBC 0.0      0.0 - 0.0 /100 WBCs Final    RBC 3.80      4.50 - 5.90 x10*6/uL Final    Hemoglobin 12.9      13.5 - 17.5 g/dL Final    Hematocrit 35.4      41.0 - 52.0 % Final    " MCV 93      80 - 100 fL Final    MCH 33.9      26.0 - 34.0 pg Final    MCHC 36.4      32.0 - 36.0 g/dL Final    RDW 16.2      11.5 - 14.5 % Final    Platelets 209      150 - 450 x10*3/uL Final    Immature Granulocytes %, Automated 0.3      0.0 - 0.9 % Final    Comment:    Immature Granulocyte Count (IG) includes promyelocytes, myelocytes and metamyelocytes but does not include bands. Percent differential counts (%) should be interpreted in the context of the absolute cell counts (cells/UL).    Immature Granulocytes Absolute, Automated 0.01      0.00 - 0.70 x10*3/uL Final                  Comprehensive Metabolic Panel        Component Value Flag Ref Range Units Status    Glucose 92      74 - 99 mg/dL Final    Sodium 142      136 - 145 mmol/L Final    Potassium 3.8      3.5 - 5.3 mmol/L Final    Chloride 102      98 - 107 mmol/L Final    Bicarbonate 25      21 - 32 mmol/L Final    Anion Gap 19      10 - 20 mmol/L Final    Urea Nitrogen 12      6 - 23 mg/dL Final    Creatinine 0.62      0.50 - 1.30 mg/dL Final    eGFR >90      >60 mL/min/1.73m*2 Final    Comment:    Calculations of estimated GFR are performed using the 2021 CKD-EPI Study Refit equation without the race variable for the IDMS-Traceable creatinine methods.  https://jasn.asnjournals.org/content/early/2021/09/22/ASN.9003827132    Calcium 9.5      8.6 - 10.6 mg/dL Final    Albumin 4.9      3.4 - 5.0 g/dL Final    Alkaline Phosphatase 80      33 - 120 U/L Final    Total Protein 8.2      6.4 - 8.2 g/dL Final    AST 43      9 - 39 U/L Final    Bilirubin, Total 0.4      0.0 - 1.2 mg/dL Final    ALT 15      10 - 52 U/L Final    Comment:    Patients treated with Sulfasalazine may generate falsely decreased results for ALT.                  Drug Screen, Urine        Component Value Flag Ref Range Units Status    Amphetamine Screen, Urine Presumptive Negative      Presumptive Negative  Final    Comment:    CUTOFF LEVEL: 500 NG/ML   Cross-reactivity has been reported  with high concentrations   of the following drugs: buproprion, chloroquine, chlorpromazine,   ephedrine, mephentermine, fenfluramine, phentermine,   phenylpropanolamine, pseudoephedrine, and propranolol.    Barbiturate Screen, Urine Presumptive Negative      Presumptive Negative  Final    Comment:    CUTOFF LEVEL: 200 NG/ML    Benzodiazepines Screen, Urine Presumptive Negative      Presumptive Negative  Final    Comment:    CUTOFF LEVEL: 200 NG/ML    Cannabinoid Screen, Urine Presumptive Negative      Presumptive Negative  Final    Comment:    CUTOFF LEVEL: 50 NG/ML    Cocaine Metabolite Screen, Urine Presumptive Negative      Presumptive Negative  Final    Comment:    CUTOFF LEVEL: 150 NG/ML    Fentanyl Screen, Urine Presumptive Negative      Presumptive Negative  Final    Comment:    CUTOFF LEVEL: 5 NG/ML    Opiate Screen, Urine Presumptive Negative      Presumptive Negative  Final    Comment:    CUTOFF LEVEL: 300 NG/ML  The opiate screen does not detect fentanyl, meperidine, or   tramadol. Oxycodone is not consistently detected (refer to  Oxycodone Screen, Urine result).    Oxycodone Screen, Urine Presumptive Negative      Presumptive Negative  Final    Comment:    CUTOFF LEVEL: 100 NG/ML  This test will accurately detect both oxycodone and oxymorphone.    PCP Screen, Urine Presumptive Negative      Presumptive Negative  Final    Comment:    CUTOFF LEVEL:  25 NG/ML  Cross-reactivity has been reported with dextromethorphan.    Methadone Screen, Urine Presumptive Negative      Presumptive Negative  Final    Comment:    CUTOFF LEVEL: 150 NG/ML  The metabolite L-alpha-acetylmethadol (LAAM) is not  detected by this method in concentrations that would  be found in the urine of patients on LAAM therapy.                  Acute Toxicology Panel, Blood        Component Value Flag Ref Range Units Status    Acetaminophen <10.0      10.0 - 30.0 ug/mL Final    Salicylate  <3      4 - 20 mg/dL Final    Alcohol 334      <=10  mg/dL Final                  Sars-CoV-2 PCR        Component Value Flag Ref Range Units Status    Coronavirus 2019, PCR Not Detected      Not Detected  Final                  Urinalysis with Reflex Microscopic        Component Value Flag Ref Range Units Status    Color, Urine Light-Yellow      Light-Yellow, Yellow, Dark-Yellow  Final    Appearance, Urine Clear      Clear  Final    Specific Gravity, Urine 1.010      1.005 - 1.035  Final    pH, Urine 5.5      5.0, 5.5, 6.0, 6.5, 7.0, 7.5, 8.0  Final    Protein, Urine 10 (TRACE)      NEGATIVE, 10 (TRACE), 20 (TRACE) mg/dL Final    Glucose, Urine Normal      Normal mg/dL Final    Blood, Urine NEGATIVE      NEGATIVE  Final    Ketones, Urine NEGATIVE      NEGATIVE mg/dL Final    Bilirubin, Urine NEGATIVE      NEGATIVE  Final    Urobilinogen, Urine Normal      Normal mg/dL Final    Nitrite, Urine NEGATIVE      NEGATIVE  Final    Leukocyte Esterase, Urine NEGATIVE      NEGATIVE  Final                  Lavender Top        Component    Extra Tube    Hold for add-ons.      Comment:    Auto resulted.                  SST TOP        Component    Extra Tube    Hold for add-ons.      Comment:    Auto resulted.                  Microscopic Only, Urine        Component Value Flag Ref Range Units Status    WBC, Urine NONE      1-5, NONE /HPF Final    RBC, Urine NONE      NONE, 1-2, 3-5 /HPF Final    Bacteria, Urine 1+      NONE SEEN /HPF Final                  Manual Differential        Component Value Flag Ref Range Units Status    Neutrophils %, Manual 9.6      40.0 - 80.0 % Final    Comment:    Percent differential counts (%) should be interpreted in the context of the absolute cell counts (cells/uL).    Lymphocytes %, Manual 84.2      13.0 - 44.0 % Final    Monocytes %, Manual 2.6      2.0 - 10.0 % Final    Eosinophils %, Manual 1.8      0.0 - 6.0 % Final    Basophils %, Manual 0.0      0.0 - 2.0 % Final    Atypical Lymphocytes %, Manual 0.9      0.0 - 2.0 % Final    Myelocytes  %, Manual 0.9      0.0 - 0.0 % Final    Seg Neutrophils Absolute, Manual 0.37      1.20 - 7.00 x10*3/uL Final    Lymphocytes Absolute, Manual 3.28      1.20 - 4.80 x10*3/uL Final    Monocytes Absolute, Manual 0.10      0.10 - 1.00 x10*3/uL Final    Eosinophils Absolute, Manual 0.07      0.00 - 0.70 x10*3/uL Final    Basophils Absolute, Manual 0.00      0.00 - 0.10 x10*3/uL Final    Atypical Lymphs Absolute, Manual 0.04      0.00 - 0.50 x10*3/uL Final    Myelocytes Absolute, Manual 0.04      0.00 - 0.00 x10*3/uL Final    Total Cells Counted 114        Final    RBC Morphology See Below        Final    Target Cells Few        Final    Stomatocytes Few        Final                     [unfilled]     There are no discontinued medications.
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
## 2024-05-29 NOTE — ED PROVIDER NOTES
HPI: 48-year-old male history of polysubstance use, alcohol use, bipolar depression with history of previous suicide attempt presenting to the ED today via CPD requesting admission to [facility], stating he has not been taking his psychiatric medications.  He feels that his bipolar is uncontrolled.  Patient is acutely intoxicated, endorsing large amount of alcohol consumption.  Unclear if additional substance use.  He endorses history of prior suicide attempts but denies any active SI/HI.  No reported AH or VH, history is limited due to patient's acute intoxication.  He is agitated but verbally redirectable, smells of EtOH.  No external signs of trauma, denies any fall or head strike.  Making sexual innuendos periodically.    ------------------------------------------------------------------------------------------------------------------------------------------  Physical Exam:    VS: As documented in the triage note and EMR flowsheet from this visit were reviewed.  Physical exam limited, patient noncooperative.    General: Disheveled, acutely intoxicated  Eyes: Pupils round and reactive. No scleral icterus.   HENT: Atraumatic. Normocephalic. Moist mucous membranes.   CV: Regular rate. No pedal edema appreciated.  Resp:  Non-labored.    Skin: Warm, dry, intact. No systemic rashes or lesions appreciated.  Neuro: Alert. No focal neuro deficits observed.  Slurred speech.    Psych: No reported active SI/HI/AH or VH.  Limited due to acute intoxication.    ------------------------------------------------------------------------------------------------------------------------------------------    Medical Decision Making:  ED Course as of 05/29/24 0730   Wed May 29, 2024   0549 Electrocardiogram, 12-lead  Sinus bradycardia with PACs, rate 53, normal axis.  No acute ST segment elevation or depressions.  , QRS 90, QTc 427. When compared to previous ECG patient is now sinus bradycardia [KR]   0657 Alcohol(!): 334  [KR]      ED Course User Index  [KR] Yenifer Whyte DO   48-year-old male history of polysubstance use, alcohol use, bipolar depression presenting to the ED for evaluation, acutely intoxicated and endorsing alcohol use.  No active endorsement of SI/HI/AH or VH although limited due to acute intoxication.  Was requesting placement at [facility], we discussed that patient would have to be clinically evaluated and would have to be more forthcoming in history and examination.  We were able to obtain basic labs which did show a significantly elevated alcohol level.  Patient does endorse noncompliance with his medications.  Ultimately patient requires clinical sobriety and repeat evaluation prior to the ability to evaluate for psychiatric needs acutely.  Signed out to oncoming provider pending repeat evaluation after clinical sobriety.  In the event that psychiatry consult is needed EPAT labs were obtained.    Differential Diagnoses Considered:  See MDM     Chronic Medical Conditions Significantly Affecting Care:  See MDM     External Records Reviewed:  I reviewed recent and relevant outside records as noted in HPI above and MDM.      Social Determinants of Health Significantly Affecting Care:  See HPI/MDM     Prescription Drug Consideration:  See MDM     Diagnostic testing considered:  See MDM and relevant sections      Yenifer Whyte DO  EM PGY-2     Yenifer Whyte DO  Resident  05/29/24 4633

## 2024-05-29 NOTE — ED PROVIDER NOTES
Handoff Note    I received Aguilar Wills in signout from previous provider.  Please see the previous note for all HPI, PE and MDM up to the time of signout at 3pm.    In brief Aguilar Wills is an 48 y.o. male presenting for   Chief Complaint   Patient presents with    Acute Intoxication    Psychiatric Evaluation   .      At the time of signout, the patient's disposition is pending EPAT placement.  Patient has been placed at [facility] and is now just awaiting a ride. Throughout the ED stay, the patient was monitored and re-examined for any changes in stability or symptomatology.     ED Course:   ED Course as of 05/29/24 1836   Wed May 29, 2024   0549 Electrocardiogram, 12-lead  Sinus bradycardia with PACs, rate 53, normal axis.  No acute ST segment elevation or depressions.  , QRS 90, QTc 427. When compared to previous ECG patient is now sinus bradycardia [KR]   0657 Alcohol(!): 334 [KR]      ED Course User Index  [KR] Yenifer Whyte, DO         Diagnoses as of 05/29/24 1836   Alcoholic intoxication without complication ([facility])   Suicidal ideation         Patient seen by and discussed with Dr. Esquivel    Pt Disposition: [facility]    Procedures      Audrey Miramontes MD  Emergency Medicine PGY-1  [facility]       Audrey Miramontes MD  Resident  05/30/24 0272

## 2024-05-30 VITALS
DIASTOLIC BLOOD PRESSURE: 77 MMHG | TEMPERATURE: 98.1 F | OXYGEN SATURATION: 99 % | HEART RATE: 65 BPM | SYSTOLIC BLOOD PRESSURE: 160 MMHG | BODY MASS INDEX: 21.19 KG/M2 | RESPIRATION RATE: 16 BRPM | WEIGHT: 135 LBS | HEIGHT: 67 IN

## 2024-05-30 NOTE — SIGNIFICANT EVENT
Application for Emergency Admission      Ready for Transfer?  Is the patient medically cleared for transfer to inpatient psychiatry: Yes  Has the patient been accepted to an inpatient psychiatric hospital: Yes    Application for Emergency Admission  IN ACCORDANCE WITH [place]122[place]  The Chief Clinical Officer of: [facility]  5/29/2024 .8:51 PM    Reason for Hospitalization  The undersigned has reason to believe that: Aguilar Wills Is a mentally ill person subject to hospitalization by court order under division B Section 5122.01 of the Revised Code, i.e., this person:    1.Yes  Represents a substantial risk of physical harm to self as manifested by evidence of threats of, or attempts at, suicide or serious self-inflicted bodily harm    2.No Represents a substantial risk of physical harm to others as manifested by evidence of recent homicidal or other violent behavior, evidence of recent threats that place another in reasonable fear of violent behavior and serious physical harm, or other evidence of present dangerousness    3.Yes Represents a substantial and immediate risk of serious physical impairment or injury to self as manifested by  evidence that the person is unable to provide for and is not providing for the person's basic physical needs because of the person's mental illness and that appropriate provision for those needs cannot be made  immediately available in the community    4.Yes Would benefit from treatment in a hospital for his mental illness and is in need of such treatment as manifested by evidence of behavior that creates a grave and imminent risk to substantial rights of others or  himself.    5.Yes Would benefit from treatment as manifested by evidence of behavior that indicates all of the following:       (a) The person is unlikely to survive safely in the community without supervision, based on a clinical determination.       (b) The person has a history of lack of compliance with  treatment for mental illness and one of the following applies:      (i) At least twice within the thirty-six months prior to the filing of an affidavit seeking court-ordered treatment of the person under section 5122.111 of the Revised Code, the lack of compliance has been a significant factor in necessitating hospitalization in a hospital or receipt of services in a forensic or other mental health unit of a correctional facility, provided that the thirty-six-month period shall be extended by the length of any hospitalization or incarceration of the person that occurred within the thirty-six-month period.      (ii) Within the forty-eight months prior to the filing of an affidavit seeking court-ordered treatment of the person under section 5122.111 of the Revised Code, the lack of compliance resulted in one or more acts of serious violent behavior toward self or others or threats of, or attempts at, serious physical harm to self or others, provided that the forty-eight-month period shall be extended by the length of any hospitalization or incarceration of the person that occurred within the forty-eight-month period.      (c) The person, as a result of mental illness, is unlikely to voluntarily participate in necessary treatment.       (d) In view of the person's treatment history and current behavior, the person is in need of treatment in order to prevent a relapse or deterioration that would be likely to result in substantial risk of serious harm to the person or others.    (e) Represents a substantial risk of physical harm to self or others if allowed to remain at liberty pending examination.    Therefore, it is requested that said person be admitted to the above named facility.    STATEMENT OF BELIEF    Must be filled out by one of the following: a psychiatrist, licensed physician, licensed clinical psychologist, health or ,  or .  (Statement shall include the circumstances under  which the individual was taken into custody and the reason for the person's belief that hospitalization is necessary. The statement shall also include a reference to efforts made to secure the individual's property at his residence if he was taken into custody there. Every reasonable and appropriate effort should be made to take this person into custody in the least conspicuous manner possible.)     48-year-old male history of polysubstance use, alcohol use, bipolar depression with history of previous suicide attempt presenting to the ED today via CPD requesting admission to [facility], stating he has not been taking his psychiatric medications.  He feels that his bipolar is uncontrolled.  Patient is acutely intoxicated, endorsing large amount of alcohol consumption.  Unclear if additional substance use.  He endorses history of prior suicide attempts but denies any active SI/HI.  No reported AH or VH, history is limited due to patient's acute intoxication.  He is agitated but verbally redirectable, smells of EtOH.  No external signs of trauma, denies any fall or head strike.  Making sexual innuendos periodically. EPAT saw and would like to admit inpatient.     Audrey Miramontes MD 5/29/2024     _____________________________________________________________   Place of Employment: [facility]    STATEMENT OF OBSERVATION BY PSYCHIATRIST, LICENSED PHYSICIAN, OR LICENSED CLINICAL PSYCHOLOGIST, IF APPLICABLE    Place of Observation (e.g., [facility] center, general hospital, office, emergency facility)  (If applicable, please complete)    Audrey Miramontes MD 5/29/2024    _____________________________________________________________

## 2024-06-03 LAB
ATRIAL RATE: 53 BPM
P AXIS: 68 DEGREES
P OFFSET: 190 MS
P ONSET: 138 MS
PR INTERVAL: 160 MS
Q ONSET: 218 MS
QRS COUNT: 9 BEATS
QRS DURATION: 90 MS
QT INTERVAL: 456 MS
QTC CALCULATION(BAZETT): 427 MS
QTC FREDERICIA: 437 MS
R AXIS: 77 DEGREES
T AXIS: 68 DEGREES
T OFFSET: 446 MS
VENTRICULAR RATE: 53 BPM

## 2024-12-12 ENCOUNTER — HOSPITAL ENCOUNTER (EMERGENCY)
Facility: HOSPITAL | Age: 49
Discharge: HOME | End: 2024-12-12
Payer: COMMERCIAL

## 2024-12-12 ENCOUNTER — APPOINTMENT (OUTPATIENT)
Dept: RADIOLOGY | Facility: HOSPITAL | Age: 49
End: 2024-12-12
Payer: COMMERCIAL

## 2024-12-12 VITALS
DIASTOLIC BLOOD PRESSURE: 82 MMHG | OXYGEN SATURATION: 98 % | BODY MASS INDEX: 24.33 KG/M2 | HEART RATE: 88 BPM | HEIGHT: 67 IN | RESPIRATION RATE: 18 BRPM | TEMPERATURE: 98.1 F | SYSTOLIC BLOOD PRESSURE: 160 MMHG | WEIGHT: 155 LBS

## 2024-12-12 DIAGNOSIS — J06.9 VIRAL URI WITH COUGH: ICD-10-CM

## 2024-12-12 DIAGNOSIS — J10.1 INFLUENZA A: ICD-10-CM

## 2024-12-12 DIAGNOSIS — Z59.819 HOUSING INSECURITY: Primary | ICD-10-CM

## 2024-12-12 LAB
FLUAV RNA RESP QL NAA+PROBE: DETECTED
FLUBV RNA RESP QL NAA+PROBE: NOT DETECTED
SARS-COV-2 RNA RESP QL NAA+PROBE: NOT DETECTED

## 2024-12-12 PROCEDURE — 2500000001 HC RX 250 WO HCPCS SELF ADMINISTERED DRUGS (ALT 637 FOR MEDICARE OP): Performed by: PHYSICIAN ASSISTANT

## 2024-12-12 PROCEDURE — 99284 EMERGENCY DEPT VISIT MOD MDM: CPT | Performed by: PHYSICIAN ASSISTANT

## 2024-12-12 PROCEDURE — 99284 EMERGENCY DEPT VISIT MOD MDM: CPT | Mod: 25

## 2024-12-12 PROCEDURE — 71046 X-RAY EXAM CHEST 2 VIEWS: CPT

## 2024-12-12 PROCEDURE — 87636 SARSCOV2 & INF A&B AMP PRB: CPT | Performed by: PHYSICIAN ASSISTANT

## 2024-12-12 PROCEDURE — 71046 X-RAY EXAM CHEST 2 VIEWS: CPT | Performed by: RADIOLOGY

## 2024-12-12 RX ORDER — BENZONATATE 100 MG/1
200 CAPSULE ORAL 3 TIMES DAILY PRN
Qty: 20 CAPSULE | Refills: 0 | Status: SHIPPED | OUTPATIENT
Start: 2024-12-12

## 2024-12-12 RX ORDER — IBUPROFEN 600 MG/1
600 TABLET ORAL EVERY 6 HOURS PRN
Qty: 30 TABLET | Refills: 0 | Status: SHIPPED | OUTPATIENT
Start: 2024-12-12

## 2024-12-12 RX ORDER — ACETAMINOPHEN 325 MG/1
650 TABLET ORAL EVERY 6 HOURS PRN
Qty: 30 TABLET | Refills: 0 | Status: SHIPPED | OUTPATIENT
Start: 2024-12-12

## 2024-12-12 RX ORDER — GUAIFENESIN/DEXTROMETHORPHAN 100-10MG/5
10 SYRUP ORAL ONCE
Status: COMPLETED | OUTPATIENT
Start: 2024-12-12 | End: 2024-12-12

## 2024-12-12 RX ORDER — IBUPROFEN 600 MG/1
600 TABLET ORAL ONCE
Status: COMPLETED | OUTPATIENT
Start: 2024-12-12 | End: 2024-12-12

## 2024-12-12 RX ORDER — GUAIFENESIN/DEXTROMETHORPHAN 100-10MG/5
10 SYRUP ORAL 3 TIMES DAILY PRN
Qty: 118 ML | Refills: 0 | Status: SHIPPED | OUTPATIENT
Start: 2024-12-12 | End: 2024-12-22

## 2024-12-12 RX ORDER — ACETAMINOPHEN 325 MG/1
975 TABLET ORAL ONCE
Status: COMPLETED | OUTPATIENT
Start: 2024-12-12 | End: 2024-12-12

## 2024-12-12 RX ADMIN — ACETAMINOPHEN 975 MG: 325 TABLET ORAL at 09:02

## 2024-12-12 RX ADMIN — IBUPROFEN 600 MG: 600 TABLET, FILM COATED ORAL at 09:02

## 2024-12-12 RX ADMIN — GUAIFENESIN AND DEXTROMETHORPHAN 10 ML: 100; 10 SYRUP ORAL at 09:02

## 2024-12-12 SDOH — ECONOMIC STABILITY - HOUSING INSECURITY: HOUSING INSTABILITY UNSPECIFIED: Z59.819

## 2024-12-12 ASSESSMENT — PAIN DESCRIPTION - DESCRIPTORS: DESCRIPTORS: ACHING

## 2024-12-12 ASSESSMENT — PAIN SCALES - WONG BAKER: WONGBAKER_NUMERICALRESPONSE: HURTS LITTLE BIT

## 2024-12-12 ASSESSMENT — PAIN SCALES - GENERAL
PAINLEVEL_OUTOF10: 3
PAINLEVEL_OUTOF10: 2

## 2024-12-12 ASSESSMENT — PAIN - FUNCTIONAL ASSESSMENT: PAIN_FUNCTIONAL_ASSESSMENT: 0-10

## 2024-12-12 ASSESSMENT — PAIN DESCRIPTION - LOCATION
LOCATION: ABDOMEN
LOCATION: ABDOMEN

## 2024-12-12 ASSESSMENT — PAIN DESCRIPTION - PAIN TYPE: TYPE: ACUTE PAIN

## 2024-12-12 NOTE — ED PROVIDER NOTES
Emergency Department Provider Note        History of Present Illness     49-year-old male with history of HTN presenting for flulike symptoms.  Symptoms started developing last night, worsened this morning.  States he has dry cough, malaise, myalgias, congestion, fatigue, coryza and rhinorrhea.  Has not take anything for symptoms.  Denies any fevers chills night sweats or rigors.  Denies any chest pain shortness of breath.  States he lives in a homeless shelter and other residents are currently sick with similar symptoms.  Has not taken anything for symptoms.      Past Medical History:   Diagnosis Date    Hypertension     Pancreatitis ([facility]-HCC)      No past surgical history on file.  Social History     Socioeconomic History    Marital status: Unknown   Tobacco Use    Smoking status: Every Day     Current packs/day: 1.00     Types: Cigarettes    Smokeless tobacco: Never     Social Drivers of Health     Financial Resource Strain: Low Risk  (4/2/2024)    Overall Financial Resource Strain (CARDIA)     Difficulty of Paying Living Expenses: Not hard at all   Food Insecurity: Not on File (9/26/2024)    Received from [email or web address]    Food Insecurity     Food: 0   Transportation Needs: No Transportation Needs (4/2/2024)    PRAPARE - Transportation     Lack of Transportation (Medical): No     Lack of Transportation (Non-Medical): No   Physical Activity: Not on File (8/26/2019)    Received from [facility]    Physical Activity     Physical Activity: 0   Stress: Not on File (8/26/2019)    Received from [facility]    Stress     Stress: 0   Social Connections: Not on File (9/21/2024)    Received from [email or web address]    Social Connections     Connectedness: 0   Housing Stability: Low Risk  (4/2/2024)    Housing Stability Vital Sign     Unable to Pay for Housing in the Last Year: No     Number of Places Lived in the Last Year: 1     Unstable Housing in the Last Year: No     Allergies   Allergen Reactions    Penicillins Hives and Unknown          External Records Reviewed including ED notes, H&P, Discharge Summary, outpatient PCP/specialist notes.  Physical Exam       Triage Vitals: T 36.7 °C (98.1 °F)  HR 88  /82  RR 18  O2 98 % None (Room air)  GEN: NAD, mildly malaised appearing, nontoxic ambulating comfortably  EYES:  EOMs grossly intact, anicteric sclera  CHHAYA: Mucosa moist.  NECK: Supple.  CARD: RRR  PULMONARY: Moving air well. Clear all lung fields.  ABDOMEN: Soft, no guarding, no rigidity. Nontender. NABS  EXTREMITIES: Full ROM, no pitting edema,   SKIN: Intact, warm and dry  NEURO: Alert and oriented x 3, speech is clear, no obvious deficits noted.         Medical Decision Making & ED Course     49-year-old male presenting for flulike symptoms since last night.  On exam he is malaised over nontoxic ambulating comfortably.  VSS.  Lungs CTABL.  CV RRR.  ABD soft NTTP.  Will provide symptomatic treatment, test for COVID and flu and perform plain film chest x-ray    ED Course as of 12/12/24 1011   Thu Dec 12, 2024   0924 XR chest 2 views  My independent interpretation is without consolidation effusion or pneumothorax, unremarkable radiology report [DAMION]   1009 Flu A Result(!): Detected [DAMION]   1009 He is notified of his positive flu results.  Given prescriptions for symptomatic relief.  Told to follow-up with PCP for persistent symptoms.  Return precautions reviewed. [DAMION]      ED Course User Index  [DAMION] Richmond Lee PA-C         Diagnoses as of 12/12/24 1011   Housing insecurity   Viral URI with cough   Influenza A     XR chest 2 views   Final Result   No acute cardiopulmonary process is evident.        MACRO:   None        Signed by: Franklin Headley 12/12/2024 9:22 AM   Dictation workstation:   [id number]        Labs Reviewed   SARS-COV-2 AND INFLUENZA A/B PCR - Abnormal       Result Value    Flu A Result Detected (*)     Flu B Result Not Detected      Coronavirus 2019, PCR Not Detected      Narrative:     This assay has received  FDA Emergency Use Authorization (EUA) and  is only authorized for the duration of time that circumstances exist to justify the authorization of the emergency use of in vitro diagnostic tests for the detection of SARS-CoV-2 virus and/or diagnosis of COVID-19 infection under section 564(b)(1) of the Act, 21 U.S.C. 360bbb-3(b)(1). Testing for SARS-CoV-2 is only recommended for patients who meet current clinical and/or epidemiological criteria as defined by federal, state, or local public health directives. This assay is an in vitro diagnostic nucleic acid amplification test for the qualitative detection of SARS-CoV-2, Influenza A, and Influenza B from nasopharyngeal specimens and has been validated for use at [facility]. Negative results do not preclude COVID-19 infections or Influenza A/B infections, and should not be used as the sole basis for diagnosis, treatment, or other management decisions. If Influenza A/B and RSV PCR results are negative, testing for Parainfluenza virus, Adenovirus and Metapneumovirus is routinely performed for [facility] [place] and intensive care inpatients, and is available on other patients by placing an add-on request.        ----------------------------------------------------------------------------------------------------------------------------    This note was dictated using a speech recognition program.  While an attempt was made at proof reading to minimize errors, minor errors in transcription may be present call for questions.     Richmond Lee PA-C  12/12/24 1013

## 2024-12-12 NOTE — ED TRIAGE NOTES
Runny nose, weakness, decreased PO intake, pt states that he lives in a shelter and everybody is sick, pt states that he also needs a refill on his HTN medication

## 2024-12-12 NOTE — DISCHARGE INSTRUCTIONS
Your flu came back positive.  Take the medications to help your symptoms.  Get plenty of rest and stable hydrated.  For persistent symptoms follow-up with your PCP